# Patient Record
Sex: FEMALE | Race: ASIAN | NOT HISPANIC OR LATINO | Employment: FULL TIME | URBAN - METROPOLITAN AREA
[De-identification: names, ages, dates, MRNs, and addresses within clinical notes are randomized per-mention and may not be internally consistent; named-entity substitution may affect disease eponyms.]

---
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
## 2021-01-19 DIAGNOSIS — F43.23 ADJUSTMENT DISORDER WITH MIXED ANXIETY AND DEPRESSED MOOD: ICD-10-CM

## 2021-01-21 RX ORDER — ESCITALOPRAM OXALATE 10 MG/1
TABLET ORAL
Qty: 14 TABLET | Refills: 0 | Status: SHIPPED | OUTPATIENT
Start: 2021-01-21 | End: 2021-04-28

## 2021-01-21 NOTE — TELEPHONE ENCOUNTER
Ok for small refill . Remind pt to do follow up for med check , since she is past due. Virtual/ Video visit ok  Too if she cannot come in

## 2021-01-21 NOTE — TELEPHONE ENCOUNTER
patient # sounds like a fax a machine  Mother number - no answer voice mail is full  Spoke with father- states that is patients phone number- she is in class- he states that patient is trying to wean herself off the medication and probably did not request a refill. She has ulysses cutting down weekly by breaking the tabs up  He will speak with her tonight when she gets home from school.    Debbie SWIFTRN BSN  [facility]  [phone]

## 2021-01-22 NOTE — TELEPHONE ENCOUNTER
Called the patient and voicemail is full.  Sent a LetMeHearYa message telling patient to schedule appointment with Dr. Kilgore in the next 2 weeks for medication refill.    Nancy Connor on 1/22/2021 at 10:14 AM

## 2021-01-26 NOTE — TELEPHONE ENCOUNTER
Called the number listed for the patient and it went right to voicemail, and then said that the mailbox is full.  Sent the patient a Runcom message to schedule medication recheck with Dr. Genesis Connor on 1/26/2021 at 4:38 PM

## 2021-01-28 ENCOUNTER — VIRTUAL VISIT (OUTPATIENT)
Dept: FAMILY MEDICINE | Facility: CLINIC | Age: 21
End: 2021-01-28
Payer: COMMERCIAL

## 2021-01-28 DIAGNOSIS — F43.23 ADJUSTMENT DISORDER WITH MIXED ANXIETY AND DEPRESSED MOOD: ICD-10-CM

## 2021-01-28 PROCEDURE — 99214 OFFICE O/P EST MOD 30 MIN: CPT | Mod: 95 | Performed by: FAMILY MEDICINE

## 2021-01-28 RX ORDER — ESCITALOPRAM OXALATE 20 MG/1
20 TABLET ORAL DAILY
Qty: 30 TABLET | Refills: 1 | Status: SHIPPED | OUTPATIENT
Start: 2021-01-28 | End: 2021-03-27

## 2021-01-28 RX ORDER — BUPROPION HYDROCHLORIDE 150 MG/1
150 TABLET ORAL EVERY MORNING
Qty: 30 TABLET | Refills: 1 | Status: SHIPPED | OUTPATIENT
Start: 2021-01-28 | End: 2021-03-27

## 2021-01-28 ASSESSMENT — PATIENT HEALTH QUESTIONNAIRE - PHQ9
5. POOR APPETITE OR OVEREATING: SEVERAL DAYS
SUM OF ALL RESPONSES TO PHQ QUESTIONS 1-9: 12

## 2021-01-28 ASSESSMENT — ANXIETY QUESTIONNAIRES
7. FEELING AFRAID AS IF SOMETHING AWFUL MIGHT HAPPEN: NEARLY EVERY DAY
5. BEING SO RESTLESS THAT IT IS HARD TO SIT STILL: NOT AT ALL
1. FEELING NERVOUS, ANXIOUS, OR ON EDGE: NEARLY EVERY DAY
6. BECOMING EASILY ANNOYED OR IRRITABLE: SEVERAL DAYS
3. WORRYING TOO MUCH ABOUT DIFFERENT THINGS: NEARLY EVERY DAY
GAD7 TOTAL SCORE: 14
2. NOT BEING ABLE TO STOP OR CONTROL WORRYING: NEARLY EVERY DAY

## 2021-01-28 NOTE — PROGRESS NOTES
Cecilio is a 20 year old who is being evaluated via a billable video visit.      How would you like to obtain your AVS? MyChart  If the video visit is dropped, the invitation should be resent by: Text to cell phone: [phone]  Will anyone else be joining your video visit? No    Video Start Time: 1:49 PM  Assessment & Plan          (F43.23) Adjustment disorder with mixed anxiety and depressed mood  Comment:   Plan: escitalopram (LEXAPRO) 20 MG tablet, buPROPion         (WELLBUTRIN XL) 150 MG 24 hr tablet, MENTAL         HEALTH REFERRAL  - Adult; Outpatient Treatment;        Individual/Couples/Family/Group Therapy/Health         Psychology; [facility]: [facility]         [phone]; We will contact you to schedule        the appointment or please call with any         questions              Discussed cares, talked about signs and symptoms of anxiety/ depression and treatment options. Willing to increase the dose of lexapro  to 20 mg.   Switched Wellbutrin to 150 XL, to improve compliance. Pros/ cons of med's discussed .   encouraged to see  to help and she is willing to try again, referral given .   spent sometimes counseling patient. Follow up in 3-4 weeks, , sooner if problem.     Script sent.Cares and  treatment discussed.    Patient expressed understanding and agreement with treatment plan. All patient's questions were answered, will let me know if has more later.  Medications: Rx's: Reviewed the potential side effects/complications of medications prescribed.   She is planning to schedule for her yearly check up as well, since she  is past due         Depression Screening Follow Up    PHQ 1/28/2021   PHQ-9 Total Score 12   Q9: Thoughts of better off dead/self-harm past 2 weeks Not at all     Last PHQ-9 1/28/2021   1.  Little interest or pleasure in doing things 1   2.  Feeling down, depressed, or hopeless 1   3.  Trouble falling or staying asleep, or sleeping too much 3   4.  Feeling tired or  having little energy 3   5.  Poor appetite or overeating 3   6.  Feeling bad about yourself 0   7.  Trouble concentrating 1   8.  Moving slowly or restless 0   Q9: Thoughts of better off dead/self-harm past 2 weeks 0   PHQ-9 Total Score 12   Difficulty at work, home, or with people -       Follow Up Actions Taken  Mental Health Referral placed       Nidia Kilgore MD  [facility] JENSEN Hernandes is a 20 year old who presents to clinic today for the following health issues     HPI       Depression and Anxiety Follow-Up    How are you doing with your depression since your last visit? Doing better since she has added Wellbutrin and she has no problem taking med's but forgets to take second dose in evening. Medication has helped although she thinks  she still needs improvement with mood and anxiety.     Still has some anxiety feeling, feels bad about herself  , need some improvement with sleep etc. She has not gone to see  for over a year, wondering if she will be able to go off medication at some point in her life     She is doing school 100% virtual, goes to gym 3x week etc     How are you doing with your anxiety since your last visit?  No change    Are you having other symptoms that might be associated with depression or anxiety? No    Have you had a significant life event? OTHER: changed work envinronments     Do you have any concerns with your use of alcohol or other drugs? No         She wants to schedule for her yearly check up as well. Since she is past due     Social History     Tobacco Use     Smoking status: Never Smoker     Smokeless tobacco: Never Used   Substance Use Topics     Alcohol use: No     Alcohol/week: 0.0 standard drinks     Drug use: Not Currently     Types: Marijuana     PHQ 5/11/2020 9/30/2020 1/28/2021   PHQ-9 Total Score 12 14 12   Q9: Thoughts of better off dead/self-harm past 2 weeks Not at all Not at all Not at all     VIOLA-7 SCORE 5/11/2020  9/30/2020 1/28/2021   Total Score - - -   Total Score 10 9 14     Last PHQ-9 1/28/2021   1.  Little interest or pleasure in doing things 1   2.  Feeling down, depressed, or hopeless 1   3.  Trouble falling or staying asleep, or sleeping too much 3   4.  Feeling tired or having little energy 3   5.  Poor appetite or overeating 3   6.  Feeling bad about yourself 0   7.  Trouble concentrating 1   8.  Moving slowly or restless 0   Q9: Thoughts of better off dead/self-harm past 2 weeks 0   PHQ-9 Total Score 12   Difficulty at work, home, or with people -     VIOLA-7  1/28/2021   1. Feeling nervous, anxious, or on edge 3   2. Not being able to stop or control worrying 3   3. Worrying too much about different things 3   4. Trouble relaxing 1   5. Being so restless that it is hard to sit still 0   6. Becoming easily annoyed or irritable 1   7. Feeling afraid, as if something awful might happen 3   VIOLA-7 Total Score 14   If you checked any problems, how difficult have they made it for you to do your work, take care of things at home, or get along with other people? -       Suicide Assessment Five-step Evaluation and Treatment (SAFE-T)          Review of Systems   Constitutional, HEENT, cardiovascular, pulmonary, GI, , musculoskeletal, neuro, skin, endocrine and psych systems are negative, except as otherwise noted.      Objective           Vitals:  No vitals were obtained today due to virtual visit.    Physical Exam   GENERAL: Healthy, alert and no distress  EYES: Eyes grossly normal to inspection.  No discharge or erythema, or obvious scleral/conjunctival abnormalities.  RESP: No audible wheeze, cough, or visible cyanosis.  No visible retractions or increased work of breathing.    SKIN: Visible skin clear. No significant rash, abnormal pigmentation or lesions.  NEURO: Cranial nerves grossly intact.  Mentation and speech appropriate for age.  PSYCH: mentation appears normal, affect flat, speech pressured, judgement and  insight intact and appearance well groomed            Video-Visit Details    Type of service:  Video Visit    Video End Time:2:17 PM    Originating Location (pt. Location): Home    Distant Location (provider location):  [facility]     Platform used for Video Visit: Virobay

## 2021-01-28 NOTE — PATIENT INSTRUCTIONS
"  Patient Education     Breathing Tips to Help You Relax  [facility]  How can breathing help me relax?  When we are have strong emotions, our bodies can tense up, and our breathing can change to short, quick breaths. Sometimes, we hold our breath without even thinking about it. Taking slow, deep breaths can help us calm down and feel more balanced.   How do I do it?  Start by tensing different muscle groups as you breathe in through your nose. Then, gradually relax them as you breathe out (exhale) For example:   1. Legs and feet: Point or flex your toes. Breathe in as you tighten the muscles in your legs. Relax these muscles as you breathe out slowly.  2. Arms: Breathe in as you tighten your arm muscles. Pretend you're squeezing heidy with your hands. Relax your muscles as you breathe out slowly.  3. Face: Breathe in as you tighten the muscles of your face. Relax your muscles as you breathe out slowly.  4. Breathe out through pursed lips, like you are blowing out a candle. Notice how the air feels on the tip of your nose, and then expands your belly and ribs. Try sensing your shoulders move up--like you're filling a cup. Focus on allowing the breath to flow, instead of forcing it.  Tips to become the most relaxed:    Imagine the breath first emptying from your belly, then the lung and rib area, and then the upper chest. It helps to count and draw the number out through the entire exhale (for example, \"onnnnnnnnneeeeeee\").    Try to let all the air out when you breathe out. Your out breath (exhale) should be about twice as long as your in breath (inhale).    Close your eyes, or try watching your belly rise and fall. You can also focus on something in the room or a word picture from your imagination.    You can breathe loudly (like you're trying to fog up a mirror) or silently.    Notice how your breath feels as you inhale and exhale through your nose, mouth or both.    It may help to place a hand " "over your belly, upper chest area or any part of your body that is tense.    If your mind wanders, bring it back to what you are focusing on. Be kind to yourself--this breathing technique takes practice.    Set a time each day to practice. Work up to longer periods of time, if you can. Even brief periods of regular practice can improve mood and brain function.  For informational purposes only. Not to replace the advice of your health care provider.   Copyright   2013 [employer]. All rights reserved. [employer] 845729 - Rev 02/16.           Patient Education   Tips for Sleep Hygiene  \"Sleep hygiene\" means having good sleep habits.Follow these tips to sleep better at night:     Get on a schedule. Go to bed and get up at about the same time every day.    Listen to your body. Only try to sleep when you actually feel tired or sleepy.    Be patient. If you haven't been able to get to sleep after about 30 minutes or more, get up and do something calming or boring until you feel sleepy. Then return to bed and try again.    Don't have caffeine (coffee, tea, cola drinks, chocolate and some medicines), alcohol or nicotine (cigarettes). These can make it harder for you to fall asleep and stay asleep.    Use your bed for sleeping only. That means no TV, computer or homework in bed, especially during the evening and before bedtime.    Don't nap during the day. If you must nap, make sure it is for less than 20 minutes.    Create sleep rituals that remind your body it is time to sleep. Examples include breathing exercises, stretching or reading a book.    Avoid all electronic media (smart phone, computer, tablet) within 2 hours of bed time. The \"blue light\" in these devices activates the part of the brain that keeps you awake.    Dim the lights at night.    Get early morning sources of light (walk in the sunshine) to help set sleep patterns at night.    Try a bath or shower before bed. Having a warm bath 1 to 2 hours " "before bedtime can help you feel sleepy. Hot baths can make you alert, so be mindful of the temperature.    Don't watch the clock. Checking the clock during the night can wake you up. It can also lead to negative thoughts such as, \"I will never fall asleep,\" which can increase anxiety and sleeplessness.    Use a sleep diary. Track your sleep schedule to know your sleep patterns and to see where you can improve.    Get regular exercise every day. Try not to do heavy exercise in the 4 hours before bedtime.    Eat a healthy, balanced diet.    Try eating a light, healthy snack before bed, but avoid eating a heavy meal.    Create the right sleeping area. A cool, dark, quiet room is best. If needed, try earplugs, fans and blackout curtains.    Keep your daytime routine the same even if you have a bad night sleep. Avoiding activities the next day can make it harder to sleep.  For informational purposes only. Not to replace the advice of your health care provider.   Copyright   2013 [facility]. All rights reserved. [employer] [id number] - 01/16.       "

## 2021-01-29 ASSESSMENT — ANXIETY QUESTIONNAIRES: GAD7 TOTAL SCORE: 14

## 2021-03-25 DIAGNOSIS — F43.23 ADJUSTMENT DISORDER WITH MIXED ANXIETY AND DEPRESSED MOOD: ICD-10-CM

## 2021-03-25 NOTE — LETTER
March 31, 2021      Ayana Hollins  [address] [place] [address]        Dear Ayana,       Our records indicates that it is time for you to be seen for an office visit with Dr. Kilgore.    Please call our office at [phone] to schedule an appointment for a physical and medication follow up before you are due for any further refills.     Please disregard this notice if you have already made an appointment.    If you are no longer a [facility] patient; Please contact us and let us know that as well. You will also need to the let the pharmacy know the name of your current Provider so that they can send future request to them.       Sincerely,    [facility]

## 2021-03-25 NOTE — TELEPHONE ENCOUNTER
Routing refill request to provider for review/approval because:  PHQ 5/11/2020 9/30/2020 1/28/2021   PHQ-9 Total Score 12 14 12   Q9: Thoughts of better off dead/self-harm past 2 weeks Not at all Not at all Not at all     PHQ9 within 6 months but greater than 5-failed protocol    Shante Galvez RN

## 2021-03-27 RX ORDER — BUPROPION HYDROCHLORIDE 150 MG/1
TABLET ORAL
Qty: 30 TABLET | Refills: 1 | Status: SHIPPED | OUTPATIENT
Start: 2021-03-27 | End: 2021-07-13

## 2021-03-27 RX ORDER — ESCITALOPRAM OXALATE 20 MG/1
TABLET ORAL
Qty: 30 TABLET | Refills: 1 | Status: SHIPPED | OUTPATIENT
Start: 2021-03-27 | End: 2021-04-28

## 2021-03-29 NOTE — TELEPHONE ENCOUNTER
Called the patient and mail box was full and was unable to leave a message, so I sent the patient a Foxfly message to call and scheduled physical with Dr. Genesis Connor on 3/29/2021 at 9:17 AM

## 2021-03-31 NOTE — TELEPHONE ENCOUNTER
Called pt unable to leave voicemail mailbox full, please schedule appointment with dr Genesis Monteiro MA

## 2021-04-11 ENCOUNTER — HEALTH MAINTENANCE LETTER (OUTPATIENT)
Age: 21
End: 2021-04-11

## 2021-04-22 ENCOUNTER — IMMUNIZATION (OUTPATIENT)
Dept: NURSING | Facility: CLINIC | Age: 21
End: 2021-04-22
Payer: COMMERCIAL

## 2021-04-22 PROCEDURE — 0001A PR COVID VAC PFIZER DIL RECON 30 MCG/0.3 ML IM: CPT

## 2021-04-22 PROCEDURE — 91300 PR COVID VAC PFIZER DIL RECON 30 MCG/0.3 ML IM: CPT

## 2021-04-28 ENCOUNTER — OFFICE VISIT (OUTPATIENT)
Dept: FAMILY MEDICINE | Facility: CLINIC | Age: 21
End: 2021-04-28
Payer: COMMERCIAL

## 2021-04-28 VITALS
BODY MASS INDEX: 20.96 KG/M2 | DIASTOLIC BLOOD PRESSURE: 62 MMHG | HEIGHT: 59 IN | SYSTOLIC BLOOD PRESSURE: 90 MMHG | TEMPERATURE: 97.6 F | WEIGHT: 104 LBS | HEART RATE: 92 BPM | OXYGEN SATURATION: 98 %

## 2021-04-28 DIAGNOSIS — I88.9 ADENITIS: ICD-10-CM

## 2021-04-28 DIAGNOSIS — F43.23 ADJUSTMENT DISORDER WITH MIXED ANXIETY AND DEPRESSED MOOD: ICD-10-CM

## 2021-04-28 DIAGNOSIS — F33.1 MAJOR DEPRESSIVE DISORDER, RECURRENT EPISODE, MODERATE (H): ICD-10-CM

## 2021-04-28 DIAGNOSIS — L30.9 DERMATITIS: Primary | ICD-10-CM

## 2021-04-28 PROCEDURE — 99214 OFFICE O/P EST MOD 30 MIN: CPT | Performed by: FAMILY MEDICINE

## 2021-04-28 RX ORDER — CEPHALEXIN 500 MG/1
500 CAPSULE ORAL 3 TIMES DAILY
Qty: 30 CAPSULE | Refills: 0 | Status: SHIPPED | OUTPATIENT
Start: 2021-04-28 | End: 2021-05-08

## 2021-04-28 RX ORDER — BUPROPION HYDROCHLORIDE 300 MG/1
300 TABLET ORAL EVERY MORNING
Qty: 30 TABLET | Refills: 1 | Status: SHIPPED | OUTPATIENT
Start: 2021-04-28 | End: 2021-07-13

## 2021-04-28 RX ORDER — CETIRIZINE HYDROCHLORIDE 10 MG/1
10 TABLET ORAL DAILY
COMMUNITY
Start: 2021-04-28 | End: 2021-12-30

## 2021-04-28 RX ORDER — ESCITALOPRAM OXALATE 20 MG/1
20 TABLET ORAL DAILY
Qty: 30 TABLET | Refills: 1 | Status: SHIPPED | OUTPATIENT
Start: 2021-04-28 | End: 2021-07-13

## 2021-04-28 ASSESSMENT — ANXIETY QUESTIONNAIRES
7. FEELING AFRAID AS IF SOMETHING AWFUL MIGHT HAPPEN: SEVERAL DAYS
2. NOT BEING ABLE TO STOP OR CONTROL WORRYING: SEVERAL DAYS
3. WORRYING TOO MUCH ABOUT DIFFERENT THINGS: SEVERAL DAYS
5. BEING SO RESTLESS THAT IT IS HARD TO SIT STILL: SEVERAL DAYS
GAD7 TOTAL SCORE: 5
6. BECOMING EASILY ANNOYED OR IRRITABLE: NOT AT ALL
IF YOU CHECKED OFF ANY PROBLEMS ON THIS QUESTIONNAIRE, HOW DIFFICULT HAVE THESE PROBLEMS MADE IT FOR YOU TO DO YOUR WORK, TAKE CARE OF THINGS AT HOME, OR GET ALONG WITH OTHER PEOPLE: SOMEWHAT DIFFICULT
1. FEELING NERVOUS, ANXIOUS, OR ON EDGE: SEVERAL DAYS

## 2021-04-28 ASSESSMENT — PATIENT HEALTH QUESTIONNAIRE - PHQ9
5. POOR APPETITE OR OVEREATING: NOT AT ALL
SUM OF ALL RESPONSES TO PHQ QUESTIONS 1-9: 8

## 2021-04-28 ASSESSMENT — MIFFLIN-ST. JEOR: SCORE: 1159.49

## 2021-04-28 NOTE — PROGRESS NOTES
Assessment & Plan     (L30.9) Dermatitis  (primary encounter diagnosis)  Comment: axilla- left more the rt , likely allergic to deodorent   Plan: cephALEXin (KEFLEX) 500 MG capsule, cetirizine         (ZYRTEC) 10 MG tablet            (I88.9) Adenitis  Comment: axiall , left more then rt , related to above   Plan: cephALEXin (KEFLEX) 500 MG capsule        Start keflex . May also take zyrtec as needed . Stop deodorant. Skin cares and  treatment discussed.         follow up if problem         (F43.23) Adjustment disorder with mixed anxiety and depressed mood  Comment:   Plan: escitalopram (LEXAPRO) 20 MG tablet,            buPROPion (WELLBUTRIN XL) 300 MG 24 hr tablet              (F33.1) Major depressive disorder, recurrent episode, moderate (H)  Comment: need improvement   Plan: escitalopram (LEXAPRO) 20 MG tablet, buPROPion         (WELLBUTRIN XL) 300 MG 24 hr tablet            Discussed cares, talked about signs and symptoms of anxiety/ depression and treatment options. Willing to increase the dose of Wellbutrin to 300  mg. Pros/ cons of med's discussed   . encouraged to see  to help and referral given . spent sometimes counseling patient. Follow up in 2-3 weeks  sooner if problem.        script sent.Cares and  treatment discussed. follow up if problem   Patient expressed understanding and agreement with treatment plan. All patient's questions were answered, will let me know if has more later.  Medications: Rx's: Reviewed the potential side effects/complications of medications prescribed.       Return in about 3 weeks (around 5/19/2021) for Physical Exam.    Nidia Kilgore MD  [facility] JENS[place] Hernandes is a 20 year old who presents for the following health issues     HPI     Concern - swollen glands   Onset: x last Wednesday   Description: swollen glands under both armpits . It has happened before.  And it happens when she changed her deodorant and she thinks she  is allergic to deodorant , and she did change it  last week   Intensity: mild  Progression of Symptoms:  worsening  Accompanying Signs & Symptoms:   Previous history of similar problem: yes above   Precipitating factors:        Worsened by:   Alleviating factors:        Improved by:   Therapies tried and outcome: None    Depression and Anxiety Follow-Up    How are you doing with your depression since your last visit? Improved although mood is still a bit down.     She has no problem taking med's  is feeling comfortable on current med's and wants to continue. need refill     How are you doing with your anxiety since your last visit?  Improved     Are you having other symptoms that might be associated with depression or anxiety? No    Have you had a significant life event? No     Do you have any concerns with your use of alcohol or other drugs? No    Social History     Tobacco Use     Smoking status: Never Smoker     Smokeless tobacco: Never Used   Substance Use Topics     Alcohol use: No     Alcohol/week: 0.0 standard drinks     Drug use: Not Currently     Types: Marijuana     PHQ 5/11/2020 9/30/2020 1/28/2021   PHQ-9 Total Score 12 14 12   Q9: Thoughts of better off dead/self-harm past 2 weeks Not at all Not at all Not at all     VIOLA-7 SCORE 5/11/2020 9/30/2020 1/28/2021   Total Score - - -   Total Score 10 9 14     Last PHQ-9 1/28/2021   1.  Little interest or pleasure in doing things 1   2.  Feeling down, depressed, or hopeless 1   3.  Trouble falling or staying asleep, or sleeping too much 3   4.  Feeling tired or having little energy 3   5.  Poor appetite or overeating 3   6.  Feeling bad about yourself 0   7.  Trouble concentrating 1   8.  Moving slowly or restless 0   Q9: Thoughts of better off dead/self-harm past 2 weeks 0   PHQ-9 Total Score 12   Difficulty at work, home, or with people -     VIOLA-7  1/28/2021   1. Feeling nervous, anxious, or on edge 3   2. Not being able to stop or control worrying 3   3.  "Worrying too much about different things 3   4. Trouble relaxing 1   5. Being so restless that it is hard to sit still 0   6. Becoming easily annoyed or irritable 1   7. Feeling afraid, as if something awful might happen 3   VIOLA-7 Total Score 14   If you checked any problems, how difficult have they made it for you to do your work, take care of things at home, or get along with other people? -       Suicide Assessment Five-step Evaluation and Treatment (SAFE-T)          Review of Systems   Constitutional, HEENT, cardiovascular, pulmonary, GI, , musculoskeletal, neuro, skin, endocrine and psych systems are negative, except as otherwise noted.      Objective    BP 90/62   Pulse 92   Temp 97.6  F (36.4  C) (Tympanic)   Ht 1.51 m (4' 11.45\")   Wt 47.2 kg (104 lb)   LMP 04/10/2021   SpO2 98%   BMI 20.69 kg/m    Body mass index is 20.69 kg/m .  Physical Exam   GENERAL: healthy, alert and no distress  HENT: ear canals and TM's normal, nose and mouth without ulcers or lesions  NECK: no adenopathy, no asymmetry  RESP: lungs clear to auscultation - no rales, rhonchi or wheezes  Axilla - has fine rash in rt arm pit not as much erythema but has tender LN, left has erythematous raised folliculitis like bumps, minimally  tender   CV: regular rate and rhythm, normal S1 S2, no S3 or S4,   ABDOMEN: soft, nontender, no hepatosplenomegaly, no masses and bowel sounds normal  NEURO: grossly normal .  mentation intact and speech normal  PSYCH: mentation appears normal, affect slightly  affect flat, speech pressured, judgement and insight intact and appearance well groomed            "

## 2021-04-28 NOTE — PATIENT INSTRUCTIONS
Patient Education     Depression: Tips to Help Yourself    As your healthcare providers help treat your depression, you can also help yourself. Keep in mind that your illness affects you emotionally, physically, mentally, and socially. So full recovery will take time. Take care of your body and your soul, and be patient with yourself as you get better.  Self-care    Educate yourself. Read about treatment and medicine options. If you have the energy, attend local conferences or support groups. Keep a list of useful websites and helpful books and use them as needed. This illness is not your fault. Don t blame yourself for your depression.    Manage early symptoms. If you notice symptoms returning, experience triggers, or identify other factors that may lead to a depressive episode, get help as soon as possible. Ask trusted friends and family to monitor your behavior and let you know if they see anything of concern.    Work with your provider. Find a provider you can trust. Communicate honestly with that person and share information on your treatment for depression and your reaction to medicines.    Be prepared for a crisis. Know what to do if you experience a crisis. Keep the phone number of a crisis hotline and know the location of your community's urgent care centers and the closest emergency department.    Hold off on big decisions. Depression can cloud your judgment. So wait until you feel better before making major life decisions, such as changing jobs, moving, or getting  or .    Be patient. Recovering from depression is a process. Don t be discouraged if it takes some time to feel better.    Keep it simple. Depression saps your energy and concentration. So you won t be able to do all the things you used to do. Set small goals and do what you can.    Be with others. Don t isolate yourself--you ll only feel worse. Try to be with other people. And take part in fun activities when you can. Go to a  movie, ballgame, [employer] service, or social event. Talk openly with people you can trust. And accept help when it s offered.    Take care of your body  People with depression often lose the desire to take care of themselves. That only makes their problems worse. During treatment and afterward, make a point to:    Exercise. It s a great way to take care of your body. And studies have shown that exercise helps fight depression. Aim for 30 minutes of moderate activity a day. Walking in small blocks of time (5-10 minutes) is a good way to start, but anything that gets you moving (gardening, house cleaning) counts.    Don't use drugs and alcohol. These may ease the pain in the short term. But they ll only make your problems worse in the long run.    Get relief from stress. Ask your healthcare provider for relaxation exercises and techniques to help relieve stress. Consider activities like meditation, yoga, or Kalpesh Chi.    Eat right. A balanced and healthy diet helps keep your body healthy.    Get adequate sleep. Aim for 8 hours per night. Too much or too little sleep can cause other physical and emotional problems.  [employer] last reviewed this educational content on 12/1/2019 2000-2021 [employer]. All rights reserved. This information is not intended as a substitute for professional medical care. Always follow your healthcare professional's instructions.

## 2021-04-29 ASSESSMENT — ANXIETY QUESTIONNAIRES: GAD7 TOTAL SCORE: 5

## 2021-05-13 ENCOUNTER — IMMUNIZATION (OUTPATIENT)
Dept: NURSING | Facility: CLINIC | Age: 21
End: 2021-05-13
Attending: INTERNAL MEDICINE
Payer: COMMERCIAL

## 2021-05-13 PROCEDURE — 0002A PR COVID VAC PFIZER DIL RECON 30 MCG/0.3 ML IM: CPT

## 2021-05-13 PROCEDURE — 91300 PR COVID VAC PFIZER DIL RECON 30 MCG/0.3 ML IM: CPT

## 2021-05-24 ENCOUNTER — OFFICE VISIT (OUTPATIENT)
Dept: FAMILY MEDICINE | Facility: CLINIC | Age: 21
End: 2021-05-24
Payer: COMMERCIAL

## 2021-05-24 VITALS
OXYGEN SATURATION: 100 % | HEART RATE: 83 BPM | DIASTOLIC BLOOD PRESSURE: 68 MMHG | WEIGHT: 102 LBS | SYSTOLIC BLOOD PRESSURE: 100 MMHG | RESPIRATION RATE: 16 BRPM | BODY MASS INDEX: 20.29 KG/M2 | TEMPERATURE: 97.7 F

## 2021-05-24 DIAGNOSIS — Z11.4 SCREENING FOR HIV (HUMAN IMMUNODEFICIENCY VIRUS): ICD-10-CM

## 2021-05-24 DIAGNOSIS — Z00.00 ROUTINE GENERAL MEDICAL EXAMINATION AT A HEALTH CARE FACILITY: Primary | ICD-10-CM

## 2021-05-24 DIAGNOSIS — Z11.3 SCREENING FOR STDS (SEXUALLY TRANSMITTED DISEASES): ICD-10-CM

## 2021-05-24 DIAGNOSIS — Z11.59 NEED FOR HEPATITIS C SCREENING TEST: ICD-10-CM

## 2021-05-24 LAB — GLUCOSE SERPL-MCNC: 76 MG/DL (ref 70–99)

## 2021-05-24 PROCEDURE — 82947 ASSAY GLUCOSE BLOOD QUANT: CPT | Performed by: FAMILY MEDICINE

## 2021-05-24 PROCEDURE — 36415 COLL VENOUS BLD VENIPUNCTURE: CPT | Performed by: FAMILY MEDICINE

## 2021-05-24 PROCEDURE — 99395 PREV VISIT EST AGE 18-39: CPT | Performed by: FAMILY MEDICINE

## 2021-05-24 PROCEDURE — 86803 HEPATITIS C AB TEST: CPT | Performed by: FAMILY MEDICINE

## 2021-05-24 PROCEDURE — 87491 CHLMYD TRACH DNA AMP PROBE: CPT | Performed by: FAMILY MEDICINE

## 2021-05-24 PROCEDURE — 87389 HIV-1 AG W/HIV-1&-2 AB AG IA: CPT | Performed by: FAMILY MEDICINE

## 2021-05-24 PROCEDURE — 87591 N.GONORRHOEAE DNA AMP PROB: CPT | Performed by: FAMILY MEDICINE

## 2021-05-24 PROCEDURE — 80061 LIPID PANEL: CPT | Performed by: FAMILY MEDICINE

## 2021-05-24 NOTE — PROGRESS NOTES
SUBJECTIVE:   CC: Ayana Hollins is an 20 year old woman who presents for preventive health visit.       Patient has been advised of split billing requirements and indicates understanding: Yes  Healthy Habits:     Getting at least 3 servings of Calcium per day:  Yes    Bi-annual eye exam:  Yes    Dental care twice a year:  NO    Sleep apnea or symptoms of sleep apnea:  Daytime drowsiness    Diet:  Vegetarian/vegan    Frequency of exercise:  2-3 days/week    Duration of exercise:  30-45 minutes    Taking medications regularly:  Yes    Barriers to taking medications:  None    Medication side effects:  None    PHQ-2 Total Score: 2    Additional concerns today:  No          PROBLEMS TO ADD ON...    Today's PHQ-2 Score:   PHQ-2 ( 1999 Pf[employer]) 5/23/2021   Q1: Little interest or pleasure in doing things 1   Q2: Feeling down, depressed or hopeless 1   PHQ-2 Score 2   Q1: Little interest or pleasure in doing things Several days   Q2: Feeling down, depressed or hopeless Several days   PHQ-2 Score 2       Abuse: Current or Past (Physical, Sexual or Emotional) - No  Do you feel safe in your environment? Yes    Have you ever done Advance Care Planning? (For example, a Health Directive, POLST, or a discussion with a medical provider or your loved ones about your wishes): No, advance care planning information given to patient to review.  Patient declined advance care planning discussion at this time.    Social History     Tobacco Use     Smoking status: Never Smoker     Smokeless tobacco: Never Used   Substance Use Topics     Alcohol use: No     Alcohol/week: 0.0 standard drinks     If you drink alcohol do you typically have >3 drinks per day or >7 drinks per week? No    Alcohol Use 5/23/2021   Prescreen: >3 drinks/day or >7 drinks/week? Not Applicable       Reviewed orders with patient.  Reviewed health maintenance and updated orders accordingly - Yes  Patient Active Problem List   Diagnosis     Menarche     Nausea without  vomiting     Adjustment disorder with mixed anxiety and depressed mood     Self-inflicted injury     Suicidal ideation     Major depressive disorder, recurrent episode, moderate (H)     Past Surgical History:   Procedure Laterality Date     NO HISTORY OF SURGERY         Social History     Tobacco Use     Smoking status: Never Smoker     Smokeless tobacco: Never Used   Substance Use Topics     Alcohol use: No     Alcohol/week: 0.0 standard drinks     Family History   Problem Relation Age of Onset     Hypertension Maternal Grandmother      Unknown/Adopted Paternal Grandmother      Unknown/Adopted Paternal Grandfather      Anxiety Disorder Father         her biological dad            Breast Cancer Screening:        History of abnormal Pap smear: NO - recommended At age 22       Reviewed and updated as needed this visit by clinical staff                 Reviewed and updated as needed this visit by Provider                Past Medical History:   Diagnosis Date     NO ACTIVE PROBLEMS         Review of Systems  CONSTITUTIONAL: NEGATIVE for fever, chills, change in weight  INTEGUMENTARU/SKIN: NEGATIVE for worrisome rashes, moles or lesions  EYES: NEGATIVE for vision changes or irritation  ENT: NEGATIVE for ear, mouth and throat problems  RESP: NEGATIVE for significant cough or SOB  BREAST: NEGATIVE for masses, tenderness or discharge  CV: NEGATIVE for chest pain, palpitations or peripheral edema  GI: NEGATIVE for nausea, abdominal pain, heartburn, or change in bowel habits  : NEGATIVE for unusual urinary or vaginal symptoms. Periods are regular.  MUSCULOSKELETAL: NEGATIVE for significant arthralgias or myalgia  NEURO: NEGATIVE for weakness, dizziness or paresthesias  ENDOCRINE: NEGATIVE for temperature intolerance, skin/hair changes  HEME/ALLERGY/IMMUNE: NEGATIVE for bleeding problems  PSYCHIATRIC: NEGATIVE for changes in mood or affect     OBJECTIVE:   There were no vitals taken for this visit.  Physical Exam  GENERAL  APPEARANCE: healthy, alert and no distress  EYES: Eyes grossly normal to inspection, PERRL and conjunctivae and sclerae normal  HENT: ear canals and TM's normal, nose and mouth without ulcers or lesions, oropharynx clear and oral mucous membranes moist  NECK: no adenopathy, no asymmetry, masses, or scars and thyroid normal to palpation  RESP: lungs clear to auscultation - no rales, rhonchi or wheezes  BREAST: normal without masses, tenderness or nipple discharge and no palpable axillary masses or adenopathy  CV: regular rate and rhythm, normal S1 S2, no S3 or S4, no murmur,no peripheral edema  ABDOMEN: soft, nontender, no hepatosplenomegaly, no masses and bowel sounds normal   (female): deferred  MS: no musculoskeletal defects are noted and gait is age appropriate without ataxia  SKIN: no suspicious lesions or rashes  NEURO: Normal strength and tone, sensory exam grossly normal, mentation intact and speech normal  PSYCH: mentation appears normal and affect normal        ASSESSMENT/PLAN:   (Z00.00) Routine general medical examination at a health care facility  (primary encounter diagnosis)  Comment:   Plan: REVIEW OF HEALTH MAINTENANCE PROTOCOL ORDERS,         NEISSERIA GONORRHOEA PCR, CHLAMYDIA TRACHOMATIS        PCR, HIV Antigen Antibody Combo, Hepatitis C         Screen Reflex to HCV RNA Quant and Genotype,         Lipid panel reflex to direct LDL Fasting,         Glucose            (Z11.3) Screening for STDs (sexually transmitted diseases)  Comment:   Plan: NEISSERIA GONORRHOEA PCR, CHLAMYDIA TRACHOMATIS        PCR, HIV Antigen Antibody Combo, Hepatitis C         Screen Reflex to HCV RNA Quant and Genotype            (Z11.4) Screening for HIV (human immunodeficiency virus)  Comment:   Plan: HIV Antigen Antibody Combo            (Z11.59) Need for hepatitis C screening test  Comment:   Plan: Hepatitis C Screen Reflex to HCV RNA Quant and         Genotype          Check lab.Cares and  treatment discussed.  follow  "up if problem   Patient expressed understanding and agreement with treatment plan. All patient's questions were answered, will let me know if has more later.  Medications: Rx's: Reviewed the potential side effects/complications of medications prescribed.       COUNSELING:  Reviewed preventive health counseling, as reflected in patient instructions       Regular exercise       Healthy diet/nutrition       Vision screening       Osteoporosis prevention/bone health       Safe sex practices/STD prevention       Consider Hep C screening for all patients one time for ages 18-79 years       HIV screeninx in teen years, 1x in adult years, and at intervals if high risk    Estimated body mass index is 20.69 kg/m  as calculated from the following:    Height as of 22: 1.51 m (4' 11.45\").    Weight as of 22: 47.2 kg (104 lb).        She reports that she has never smoked. She has never used smokeless tobacco.      Counseling Resources:  ATP IV Guidelines  Pooled Cohorts Equation Calculator  Breast Cancer Risk Calculator  BRCA-Related Cancer Risk Assessment: FHS-7 Tool  FRAX Risk Assessment  ICSI Preventive Guidelines  Dietary Guidelines for Americans, 2010  USDA's MyPlate  ASA Prophylaxis  Lung CA Screening    Nidia Kilgore MD  [facility]  "

## 2021-05-25 LAB
C TRACH DNA SPEC QL NAA+PROBE: NEGATIVE
CHOLEST SERPL-MCNC: 170 MG/DL
HCV AB SERPL QL IA: NONREACTIVE
HDLC SERPL-MCNC: 46 MG/DL
HIV 1+2 AB+HIV1 P24 AG SERPL QL IA: NONREACTIVE
LDLC SERPL CALC-MCNC: 115 MG/DL
N GONORRHOEA DNA SPEC QL NAA+PROBE: NEGATIVE
NONHDLC SERPL-MCNC: 124 MG/DL
SPECIMEN SOURCE: NORMAL
SPECIMEN SOURCE: NORMAL
TRIGL SERPL-MCNC: 44 MG/DL

## 2021-06-28 ENCOUNTER — ALLIED HEALTH/NURSE VISIT (OUTPATIENT)
Dept: NURSING | Facility: CLINIC | Age: 21
End: 2021-06-28
Payer: COMMERCIAL

## 2021-06-28 DIAGNOSIS — Z23 NEED FOR VACCINATION: Primary | ICD-10-CM

## 2021-06-28 PROCEDURE — 90651 9VHPV VACCINE 2/3 DOSE IM: CPT

## 2021-06-28 PROCEDURE — 99207 PR NO CHARGE NURSE ONLY: CPT

## 2021-06-28 PROCEDURE — 90471 IMMUNIZATION ADMIN: CPT

## 2021-07-13 ENCOUNTER — OFFICE VISIT (OUTPATIENT)
Dept: FAMILY MEDICINE | Facility: CLINIC | Age: 21
End: 2021-07-13
Payer: COMMERCIAL

## 2021-07-13 VITALS
HEART RATE: 100 BPM | BODY MASS INDEX: 20.29 KG/M2 | OXYGEN SATURATION: 98 % | TEMPERATURE: 98.4 F | SYSTOLIC BLOOD PRESSURE: 84 MMHG | DIASTOLIC BLOOD PRESSURE: 60 MMHG | WEIGHT: 102 LBS

## 2021-07-13 DIAGNOSIS — N63.20 LEFT BREAST LUMP: Primary | ICD-10-CM

## 2021-07-13 DIAGNOSIS — F33.1 MAJOR DEPRESSIVE DISORDER, RECURRENT EPISODE, MODERATE (H): ICD-10-CM

## 2021-07-13 DIAGNOSIS — F43.23 ADJUSTMENT DISORDER WITH MIXED ANXIETY AND DEPRESSED MOOD: ICD-10-CM

## 2021-07-13 PROCEDURE — 99214 OFFICE O/P EST MOD 30 MIN: CPT | Performed by: FAMILY MEDICINE

## 2021-07-13 RX ORDER — ESCITALOPRAM OXALATE 20 MG/1
20 TABLET ORAL DAILY
Qty: 30 TABLET | Refills: 1 | Status: SHIPPED | OUTPATIENT
Start: 2021-07-13 | End: 2021-08-09

## 2021-07-13 RX ORDER — BUPROPION HYDROCHLORIDE 300 MG/1
300 TABLET ORAL EVERY MORNING
Qty: 30 TABLET | Refills: 1 | Status: SHIPPED | OUTPATIENT
Start: 2021-07-13 | End: 2021-08-09

## 2021-07-13 ASSESSMENT — PATIENT HEALTH QUESTIONNAIRE - PHQ9
SUM OF ALL RESPONSES TO PHQ QUESTIONS 1-9: 10
10. IF YOU CHECKED OFF ANY PROBLEMS, HOW DIFFICULT HAVE THESE PROBLEMS MADE IT FOR YOU TO DO YOUR WORK, TAKE CARE OF THINGS AT HOME, OR GET ALONG WITH OTHER PEOPLE: SOMEWHAT DIFFICULT
SUM OF ALL RESPONSES TO PHQ QUESTIONS 1-9: 10

## 2021-07-13 ASSESSMENT — ANXIETY QUESTIONNAIRES
6. BECOMING EASILY ANNOYED OR IRRITABLE: NOT AT ALL
7. FEELING AFRAID AS IF SOMETHING AWFUL MIGHT HAPPEN: NOT AT ALL
4. TROUBLE RELAXING: NOT AT ALL
1. FEELING NERVOUS, ANXIOUS, OR ON EDGE: SEVERAL DAYS
8. IF YOU CHECKED OFF ANY PROBLEMS, HOW DIFFICULT HAVE THESE MADE IT FOR YOU TO DO YOUR WORK, TAKE CARE OF THINGS AT HOME, OR GET ALONG WITH OTHER PEOPLE?: SOMEWHAT DIFFICULT
GAD7 TOTAL SCORE: 3
2. NOT BEING ABLE TO STOP OR CONTROL WORRYING: SEVERAL DAYS
5. BEING SO RESTLESS THAT IT IS HARD TO SIT STILL: NOT AT ALL
GAD7 TOTAL SCORE: 3
3. WORRYING TOO MUCH ABOUT DIFFERENT THINGS: SEVERAL DAYS
GAD7 TOTAL SCORE: 3
7. FEELING AFRAID AS IF SOMETHING AWFUL MIGHT HAPPEN: NOT AT ALL

## 2021-07-13 ASSESSMENT — PAIN SCALES - GENERAL: PAINLEVEL: NO PAIN (0)

## 2021-07-13 NOTE — PROGRESS NOTES
Assessment & Plan     (N63.20) Left breast lump  (primary encounter diagnosis)  Comment: 2 lumps - left outer qudrent of breast   Plan: US Breast Left Complete 4 Quadrants         Suspect fibroadenoma/versus cyst.  She will proceed for further evaluation    Breast ultrasound.  Follow-up as needed.     (F33.1) Major depressive disorder, recurrent episode, moderate (H)  Comment:   Plan: buPROPion (WELLBUTRIN XL) 300 MG 24 hr tablet,         escitalopram (LEXAPRO) 20 MG tablet            (F43.23) Adjustment disorder with mixed anxiety and depressed mood  Comment:   Plan: escitalopram (LEXAPRO) 20 MG tablet            Discussed cares, talked about signs and symptoms of anxiety/ depression and treatment options. Willing to stay on same medication dose for now.  pros/ cons of med's discussed .   encouraged to see  to help and referral has been given previously.  She had been reluctant in the past although she will consider, that may help some improvement in her mood. . spent sometimes counseling patient. Follow up in  3 months, sooner if problem.       Cares and  treatment discussed.  follow up if problem   Patient expressed understanding and agreement with treatment plan. All patient's questions were answered, will let me know if has more later.  Medications: Rx's: Reviewed the potential side effects/complications of medications prescribed.         Return if symptoms worsen or fail to improve, for [facility] [phone].    Nidia Kilgore MD  [facility] JENSEN Hernandes is a 20 year old who presents for the following health issues     HPI     Concern - Breast lump  Onset: ? 2 months  Description: quarter sized lump - left breast  X 2 months   No change, no pain, no discomfort but just wanted to get checked . No fam hx of breast cancer       Depression and Anxiety Follow-Up    How are you doing with your depression since your last visit? No change, taking  medication could use refill.  Sometimes has mild symptoms, but she thinks she is doing okay.  She has been reluctant to see the therapist, as she is afraid that stepfather may not approve, testing things did not go very well with the previous counselor.     How are you doing with your anxiety since your last visit?  Improved and feeling comfortable at current dose     Are you having other symptoms that might be associated with depression or anxiety? Yes:  but feeling ok on current dose     Have you had a significant life event? OTHER: family stress but she thinks she is handelling ok      Do you have any concerns with your use of alcohol or other drugs? No    Social History     Tobacco Use     Smoking status: Never Smoker     Smokeless tobacco: Never Used   Substance Use Topics     Alcohol use: No     Alcohol/week: 0.0 standard drinks     Drug use: Not Currently     Types: Marijuana     PHQ 1/28/2021 4/28/2021 7/13/2021   PHQ-9 Total Score 12 8 10   Q9: Thoughts of better off dead/self-harm past 2 weeks Not at all Not at all Not at all     VIOLA-7 SCORE 1/28/2021 4/28/2021 7/13/2021   Total Score - - -   Total Score - - 3 (minimal anxiety)   Total Score 14 5 3     Last PHQ-9 7/13/2021   1.  Little interest or pleasure in doing things 1   2.  Feeling down, depressed, or hopeless 1   3.  Trouble falling or staying asleep, or sleeping too much 1   4.  Feeling tired or having little energy 2   5.  Poor appetite or overeating 1   6.  Feeling bad about yourself 1   7.  Trouble concentrating 2   8.  Moving slowly or restless 1   Q9: Thoughts of better off dead/self-harm past 2 weeks 0   PHQ-9 Total Score 10   Difficulty at work, home, or with people -     VIOLA-7  7/13/2021   1. Feeling nervous, anxious, or on edge 1   2. Not being able to stop or control worrying 1   3. Worrying too much about different things 1   4. Trouble relaxing 0   5. Being so restless that it is hard to sit still 0   6. Becoming easily annoyed or  irritable 0   7. Feeling afraid, as if something awful might happen 0   VIOLA-7 Total Score 3   If you checked any problems, how difficult have they made it for you to do your work, take care of things at home, or get along with other people? -           Review of Systems   Constitutional, HEENT, cardiovascular, pulmonary, GI, , musculoskeletal, neuro, skin, endocrine and psych systems are negative, except as otherwise noted.      Objective    BP (!) 84/60   Pulse 100   Temp 98.4  F (36.9  C) (Tympanic)   Wt 46.3 kg (102 lb)   LMP 07/13/2021   SpO2 98%   BMI 20.29 kg/m    Body mass index is 20.29 kg/m .  Physical Exam   GENERAL: healthy, alert and no distress  NECK: no adenopathy, no asymmetry, masses, or scars and thyroid normal to palpation  RESP: lungs clear to auscultation - no rales, rhonchi or wheezes  BREAST: Right breast normal without masses, tenderness or nipple discharge and no palpable axillary masses or adenopathy  BREAST: Left with couple of palpable lumps 1 in the right upper outer quadrant and another 1 middle part of the left upper breast , they are approximately an inch size each , firm,  nontender , no no skin changes, no nipple discharge, no palpable axillary masses or adenopathy.   CV: regular rate and rhythm, normal S1 S2, no S3 or S4,  ABDOMEN: soft, nontender, no hepatosplenomegaly, no masses and bowel sounds normal  PSYCH: mentation appears normal, affect normal

## 2021-07-14 ASSESSMENT — ANXIETY QUESTIONNAIRES: GAD7 TOTAL SCORE: 3

## 2021-08-03 ENCOUNTER — HOSPITAL ENCOUNTER (OUTPATIENT)
Dept: MAMMOGRAPHY | Facility: CLINIC | Age: 21
End: 2021-08-03
Attending: FAMILY MEDICINE
Payer: COMMERCIAL

## 2021-08-03 DIAGNOSIS — N63.20 LEFT BREAST LUMP: ICD-10-CM

## 2021-08-03 PROCEDURE — 76642 ULTRASOUND BREAST LIMITED: CPT | Mod: LT

## 2021-08-05 DIAGNOSIS — F43.23 ADJUSTMENT DISORDER WITH MIXED ANXIETY AND DEPRESSED MOOD: ICD-10-CM

## 2021-08-05 DIAGNOSIS — F33.1 MAJOR DEPRESSIVE DISORDER, RECURRENT EPISODE, MODERATE (H): ICD-10-CM

## 2021-08-09 RX ORDER — ESCITALOPRAM OXALATE 20 MG/1
TABLET ORAL
Qty: 30 TABLET | Refills: 1 | Status: SHIPPED | OUTPATIENT
Start: 2021-08-09 | End: 2021-12-30

## 2021-08-09 RX ORDER — BUPROPION HYDROCHLORIDE 300 MG/1
TABLET ORAL
Qty: 30 TABLET | Refills: 1 | Status: SHIPPED | OUTPATIENT
Start: 2021-08-09 | End: 2021-12-30

## 2021-08-09 NOTE — TELEPHONE ENCOUNTER
Failed phq9 protocol.  please route to  team if patient needs an appointment     Debbie SWIFTRN BSN  [facility]  [phone]

## 2021-09-25 ENCOUNTER — HEALTH MAINTENANCE LETTER (OUTPATIENT)
Age: 21
End: 2021-09-25

## 2021-12-30 ENCOUNTER — OFFICE VISIT (OUTPATIENT)
Dept: FAMILY MEDICINE | Facility: CLINIC | Age: 21
End: 2021-12-30
Payer: COMMERCIAL

## 2021-12-30 VITALS
DIASTOLIC BLOOD PRESSURE: 50 MMHG | HEART RATE: 78 BPM | SYSTOLIC BLOOD PRESSURE: 86 MMHG | RESPIRATION RATE: 18 BRPM | TEMPERATURE: 97.6 F | OXYGEN SATURATION: 97 %

## 2021-12-30 DIAGNOSIS — F33.1 MAJOR DEPRESSIVE DISORDER, RECURRENT EPISODE, MODERATE (H): ICD-10-CM

## 2021-12-30 DIAGNOSIS — R05.9 COUGH: Primary | ICD-10-CM

## 2021-12-30 DIAGNOSIS — Z20.822 EXPOSURE TO 2019 NOVEL CORONAVIRUS: ICD-10-CM

## 2021-12-30 DIAGNOSIS — F43.23 ADJUSTMENT DISORDER WITH MIXED ANXIETY AND DEPRESSED MOOD: ICD-10-CM

## 2021-12-30 PROCEDURE — U0005 INFEC AGEN DETEC AMPLI PROBE: HCPCS | Performed by: FAMILY MEDICINE

## 2021-12-30 PROCEDURE — U0003 INFECTIOUS AGENT DETECTION BY NUCLEIC ACID (DNA OR RNA); SEVERE ACUTE RESPIRATORY SYNDROME CORONAVIRUS 2 (SARS-COV-2) (CORONAVIRUS DISEASE [COVID-19]), AMPLIFIED PROBE TECHNIQUE, MAKING USE OF HIGH THROUGHPUT TECHNOLOGIES AS DESCRIBED BY CMS-2020-01-R: HCPCS | Performed by: FAMILY MEDICINE

## 2021-12-30 PROCEDURE — 99214 OFFICE O/P EST MOD 30 MIN: CPT | Performed by: FAMILY MEDICINE

## 2021-12-30 RX ORDER — ESCITALOPRAM OXALATE 20 MG/1
20 TABLET ORAL DAILY
Qty: 90 TABLET | Refills: 1 | Status: SHIPPED | OUTPATIENT
Start: 2021-12-30 | End: 2022-03-25

## 2021-12-30 ASSESSMENT — PATIENT HEALTH QUESTIONNAIRE - PHQ9
10. IF YOU CHECKED OFF ANY PROBLEMS, HOW DIFFICULT HAVE THESE PROBLEMS MADE IT FOR YOU TO DO YOUR WORK, TAKE CARE OF THINGS AT HOME, OR GET ALONG WITH OTHER PEOPLE: SOMEWHAT DIFFICULT
SUM OF ALL RESPONSES TO PHQ QUESTIONS 1-9: 4
SUM OF ALL RESPONSES TO PHQ QUESTIONS 1-9: 4

## 2021-12-30 ASSESSMENT — PAIN SCALES - GENERAL: PAINLEVEL: NO PAIN (0)

## 2021-12-30 ASSESSMENT — ENCOUNTER SYMPTOMS: COUGH: 1

## 2021-12-30 NOTE — PROGRESS NOTES
Assessment & Plan     (R05.9) Cough  (primary encounter diagnosis)  Comment:   Plan: Symptomatic; Unknown COVID-19 Virus         (Coronavirus) by PCR Nose                 URI mild sx but had positive exposure. Check abs. talked about COVID precautions, preventing the spread , isolation etc,  just to be on safe side .  If she is negative she will schedule for booster soon    Talked about warning s/s for which should be seen   Cares and symptomatic treatment discussed. She also plans to see her eye doctor if any ongoing issue.    Follow up if problem.       (F33.1) Major depressive disorder, recurrent episode, moderate (H)  Comment:   Plan: escitalopram (LEXAPRO) 20 MG tablet            (F43.23) Adjustment disorder with mixed anxiety and depressed mood  Comment: has stopped Wellbutrin for few months and feeling ok on current med's   Plan: escitalopram (LEXAPRO) 20 MG tablet          Discussed cares, talked about signs and symptoms of anxiety/ depression and treatment options. Willing to stay on same med's for now. Pros/ cons of med's discussed .  . spent sometimes counseling patient. Follow up in 4-6   months, sooner if problem.         Check labs. refill sent.Cares and  treatment discussed. follow up if problem   Patient expressed understanding and agreement with treatment plan. All patient's questions were answered, will let me know if has more later.  Medications: Rx's: Reviewed the potential side effects/complications of medications prescribed.       Nidia Kilgore MD  [facility] JENSEN Hernandes is a 21 year old who presents for the following health issues     Cough         Acute Illness  Acute illness concerns:  Dry cough 1.5 weeks , not sure if it could be allergy related as she sneeze sometimes too but just worried about COVID. bc friend tested positive for covid and she was exposed to her last week   Symptoms:  Fever: no  Chills/Sweats: no  Headache (location?): no  Sinus  "Pressure: no  Conjunctivitis:  no  Ear Pain: no  Rhinorrhea: no  Congestion: no  Sore Throat: no  Cough: YES - Dry  Wheeze: no  Decreased Appetite: no  Nausea: no  Vomiting: no  Diarrhea: no  Dysuria/Freq.: no  Dysuria or Hematuria: no  Fatigue/Achiness: no  Sick/Strep Exposure: YES- Dad with bronchitis but had negative COVID   Therapies tried and outcome: NONE  Depression and Anxiety Follow-Up    How are you doing with your depression since your last visit? Improved has stopped Wellbutrin\"  few months ago bc it did  Not help and did not like it made her felt\". But now she if feeling fine without it and     How are you doing with your anxiety since your last visit?  Improved     Are you having other symptoms that might be associated with depression or anxiety? No    Have you had a significant life event? No     Do you have any concerns with your use of alcohol or other drugs? No    Social History     Tobacco Use     Smoking status: Never Smoker     Smokeless tobacco: Never Used   Substance Use Topics     Alcohol use: No     Alcohol/week: 0.0 standard drinks     Drug use: Not Currently     Types: Marijuana     PHQ 4/28/2021 7/13/2021 12/30/2021   PHQ-9 Total Score 8 10 4   Q9: Thoughts of better off dead/self-harm past 2 weeks Not at all Not at all Not at all     VIOLA-7 SCORE 1/28/2021 4/28/2021 7/13/2021   Total Score - - -   Total Score - - 3 (minimal anxiety)   Total Score 14 5 3     Last PHQ-9 12/30/2021   1.  Little interest or pleasure in doing things 0   2.  Feeling down, depressed, or hopeless 1   3.  Trouble falling or staying asleep, or sleeping too much 1   4.  Feeling tired or having little energy 1   5.  Poor appetite or overeating 1   6.  Feeling bad about yourself 0   7.  Trouble concentrating 0   8.  Moving slowly or restless 0   Q9: Thoughts of better off dead/self-harm past 2 weeks 0   PHQ-9 Total Score 4   Difficulty at work, home, or with people -     VIOLA-7  7/13/2021   1. Feeling nervous, anxious, " or on edge 1   2. Not being able to stop or control worrying 1   3. Worrying too much about different things 1   4. Trouble relaxing 0   5. Being so restless that it is hard to sit still 0   6. Becoming easily annoyed or irritable 0   7. Feeling afraid, as if something awful might happen 0   VIOLA-7 Total Score 3   If you checked any problems, how difficult have they made it for you to do your work, take care of things at home, or get along with other people? -           Review of Systems   Respiratory: Positive for cough.       Constitutional, HEENT, cardiovascular, pulmonary, GI, , musculoskeletal, neuro, skin, endocrine and psych systems are negative, except as otherwise noted.      Objective    BP (!) 86/50   Pulse 78   Temp 97.6  F (36.4  C) (Tympanic)   Resp 18   LMP 11/29/2021 (Approximate)   SpO2 97%   There is no height or weight on file to calculate BMI.  Physical Exam   GENERAL: healthy, alert and no distress  HENT: oropharynx clear and oral mucous membranes moist, no sinus tenderness   NECK: no adenopathy  RESP: lungs clear to auscultation - no rales, rhonchi or wheezes  CV: regular rate and rhythm, normal S1 S2,   ABDOMEN: soft, nontender, no hepatosplenomegaly, no masses and bowel sounds normal  NEURO: Normal strength and tone, mentation intact and speech normal  PSYCH: mentation appears normal, affect normal

## 2021-12-30 NOTE — PATIENT INSTRUCTIONS
Patient Education       Coronavirus Disease 2019 (COVID-19): Overview  Coronavirus disease 2019 (COVID-19) is an illness that infects the lungs. It's caused by a type of coronavirus called SARS-CoV-2. There are many types of coronaviruses. They are a very common cause of colds and bronchitis. They can cause a lung infection called pneumonia. Symptoms can range from mild to severe. Some people have no symptoms. These viruses are also found in some animals.   The virus that causes COVID-19 changes (mutates) all the time. This is what all viruses do. It leads to different versions of a virus. These are called variants. COVID-19 variants may spread more easily from person to person. They may cause milder symptoms. Or they may cause more severe symptoms.    The virus spreads and infects people easily. It can infect a person more easily if they are not immune to it. The virus most often spreads through droplets of fluid that a person coughs or sneezes into the air. It may be spread to you if you touch a surface with the virus on it and then touch your eyes, nose, or mouth.      To help prevent spreading the infection, wash your hands often, or use an alcohol-based hand .     For the latest information from the CDC:      Go to the CDC website    Call [phone] ([phone])    What are the symptoms of COVID-19?  Some people have no symptoms. Some have mild symptoms. And other people may have severe symptoms. Types of symptoms can vary from person to person. They may appear 2 to 14 days after contact with the virus. They can include:     Fever    Chills    Coughing    Trouble breathing or feeling short of breath    Sore throat    Stuffy or runny nose    Headache    Body aches    Tiredness    Nausea, vomiting, diarrhea, or abdominal pain    New loss of sense of smell or taste  Check your symptoms with the CDC s Coronavirus Self-.   What are possible complications of COVID-19?  The virus can cause an  infection in the lungs. This is called pneumonia. In some cases, this can lead to death. Experts are still learning more about COVID-19 complications. Many other complications are possible. They include:     Low blood pressure    Kidney failure    Inflammation of the brain or heart    Rashes  Some people are at higher risk for complications. This includes:     Older adults    People with heart or lung disease    People with diabetes or kidney disease    People with health conditions that suppress the immune system    People who take medicines that suppress the immune system  Rarely, a child may have a severe complication. This is called multisystem inflammatory syndrome in children (MIS-C). MIS-C seems to be like Kawasaki disease. This is a rare illness. It causes inflammation of blood vessels and body organs. MIS can also happen in adults. But this is less common.     How is COVID-19 diagnosed?  Your healthcare provider will ask:    What symptoms you have    Where you live    If you ve traveled recently    If you ve had contact with sick people    If you are vaccinated against COVID-19    If you have had COVID-19   You may have 1 of these tests for COVID-19:    Viral (molecular) test. You may also hear this called a PCR or RT-PCR test. Viral tests are very accurate. A viral test looks for the genetic material (RNA) of the SARS-CoV-2 virus. There are a few ways to do this. A swab may be wiped inside your nose or throat. Or a long swab may be put into your nose down to the back of your throat. Or a sample of your saliva may be taken. Your test results may be back in 45 minutes to a few hours. This depends on the type of test. Some tests must be sent to a lab. These can take several days for the results. Test kits you can use at home are now available. Some of these need a prescription. If you use a home kit, follow the instructions in the kit closely. Some kits show results quickly at home. Others must be sent to a  lab for the results.    Antigen test. This can find proteins from the SARS-CoV-2 virus. A swab may be wiped inside your nose or throat. Or a long swab may be put into your nose down to the back of your throat. Some results are back within 15 to 60 minutes. This depends on the type of test. Positive results are very accurate. But false positive results can happen. And the results can be negative even in people with COVID-19. This is more common in places where not many people have the virus. Antigen tests are more likely to miss a COVID-19 infection than a viral (molecular) test. You may need to have a viral test if your antigen test is negative but you have symptoms of COVID-19.  If your provider thinks or confirms that you have COVID-19, you may have other tests. These tests may include:     Antibody blood test. This type of test can show if you had the virus in the past. It shows antibodies for the virus in the blood. The accuracy of these tests varies. And they are not available everywhere. An antibody test may not show if you have an infection right now. This is because it can take up to a few weeks for your body to make antibodies. None of the antibody tests can yet be used to tell if a person is immune to the virus.    Sputum culture. If you have a wet cough, you may be asked to cough up a bit of mucus (sputum) from your lungs. This is tested for the virus. It may be tested for pneumonia.    Imaging tests. You may have a chest X-ray or CT scan.  Can you get COVID-19 again?  Yes, you can get COVID-19 more than once. You may have not gotten immunity. You could have lost the immunity. Or you may get COVID-19 from a different strain (variant) of the virus that you are not immune to. But the COVID-19 vaccine helps people who had COVID-19 lower their risk of having the illness again.   Vaccines for COVID-19  The FDA and CDC advise vaccines to help prevent COVID-19. The vaccines can also make the illness less severe.  It can keep you from needing to go to the hospital.  And it can prevent the spread of the virus to other people. No vaccine is 100% effective at preventing an illness. But getting a vaccine is important. The Pfizer vaccine is available for people as young as age 5. Pregnant or breastfeeding people can have the vaccine. Ask your healthcare provider which vaccine may be best for you.   The vaccines are given as a shot (injection). This is most often done in a muscle in the upper arm. There is a 1-dose vaccine. This is from [employer]. There are 2-dose vaccines. These are from Pfizer and Moderna. For a 2-dose vaccine, the second dose is given several weeks after the first. Some people may need a third dose of Pfizer or Moderna.   Who needs a third dose of Pfizer or Moderna?  A third dose of the Pfizer or Moderna vaccine may be needed for some people. This is for people who have a very weak immune system. The third dose is part of their first (primary) series. It's not a booster. This can happen if you had a solid organ transplant. It can be caused by other conditions or treatments. This third dose is to help a person with a weak immune system build up better protection against the virus. It's given at least 28 days after the second dose. Talk with your healthcare provider about your health risks to see if you need a third dose as part of your primary series.   COVID-19 vaccine booster shots  Everyone age 18 or older can get a COVID-19 booster shot. Here are your options.   Pfizer or Moderna booster  A booster shot of the Pfizer or Moderna vaccines is advised for many people. The booster shot is to be given at least 6 months after your primary series. Talk with your healthcare provider about your situation and risk. The CDC says these people should get a Pfizer or Moderna booster shot:       People age 50 or older    People age 18 or older who live in long-term care facilities    The CDC states people 18 to 49  may choose to get the booster. This depends on their specific case and risk. This includes people with certain health conditions. It also includes people whose job or living setting puts them at higher risk for COVID-19.   Felix & Felix booster  A booster shot of the 1-dose Felix & Felix vaccine is also available. It's advised for people ages 18 and older who got their first dose 2 or more months ago.   Mix and match  You may be able to choose which vaccine you get as a booster. This is called mix and match. Talk with your healthcare provider to learn more.   How is COVID-19 treated?  The most proven treatments right now are those to help your body while it fights the virus. This is known as supportive care. It includes:     Getting rest.This helps your body fight the illness.    Drinking fluids. Try to drink 6 to 8 glasses of fluids every day. Ask your provider which drinks are best for you. Don't have drinks with caffeine or alcohol.    Taking over-the-counter (OTC) medicine.  These are used to help ease pain and reduce fever. Ask your provider which OTC medicine is safe for you to use.  You may need to stay in the hospital for severe illness. Your care may include:     IV (intravenous) fluids. These are given through a vein. This helps to replace fluids in your body.    Oxygen. You may be given supplemental oxygen. Or you may be put on a breathing machine (ventilator). This is done so you get enough oxygen in your body.    Prone positioning. Your healthcare team may regularly turn you on your stomach. This is called prone positioning. It helps increase the amount of oxygen you get to your lungs. Follow their instructions on position changes while you're in the hospital. Also follow their advice on the best positions to help your breathing once you go home.    Remdesivir. This is an antiviral medicine. The FDA has approved it for use on COVID-19. It works by stopping the spread of the SARS-CoV-2 virus in  the body. It's approved only for people who are in the hospital. It's for people 12 years and older who weigh at least 88 pounds (40 kgs). In some cases, it may also be used for people younger than 12 years or who weigh less than 88 pounds (40 kgs).    Steroids or other anti-inflammatory medicines.  These are used to lessen the intense inflammation that some people with COVID-19 can have. The inflammation can lead to more trouble breathing. It can cause other complications or death.    COVID-19 convalescent plasma. Plasma is the liquid part of blood. People who had COVID-19 may be asked to donate plasma. This is called COVID-19 convalescent plasma. The plasma may have antibodies. These can help fight COVID-19 in people who are very ill with it. The FDA has approved it for emergency use in some people with severe but early COVID-19. Ask your provider if you qualify to donate.    Monoclonal antibody therapy. The FDA approved this for emergency use in some people. They must have a positive COVID-19 viral test. They must have mild to moderate symptoms. They can t be in the hospital. It's approved for people 12 years and older. They must weigh at least 88 pounds (40 kgs). And they must be at high risk for severe COVID-19 and a hospital stay. This includes people who are 65 years and older. And it includes people with some chronic conditions. This therapy is not approved for people who are in the hospital with COVID-19 or who need oxygen. Your healthcare team will tell you if you qualify.  Are you at risk for COVID-19?  You are at risk for COVID-19 if any of these apply to you:    You live in an area with cases of COVID-19    You traveled to an area with cases of COVID-19    You had close contact with someone who had COVID-19  Close contact means being within 6 feet.   Keep in mind that COVID-19 may be spread by people who do not show symptoms.   Last updated: 11/22/2021   [employer] last reviewed this educational content  on 9/1/2021 2000-2021 [employer]. All rights reserved. This information is not intended as a substitute for professional medical care. Always follow your healthcare professional's instructions.           Patient Education     Understanding COVID-19 (Coronavirus Disease 2019)  COVID-19 is an illness of the lungs. It causes fever, coughing and trouble breathing. The illness is caused by a new virus in the coronavirus family called SARS-CoV-2.  First seen in late 2019, this virus has spread to many cities and countries around the world. It seems to spread and infect people fairly easily. Scientists are working to understand it better.  For the latest information, visit the CDC website at www.cdc.gov/coronavirus/2019-ncov.html Or call 791-MDA-XMDM ([phone]).   How does COVID-19 spread?  The virus may spread by:    Breathing in droplets of fluid that someone has coughed or sneezed into the air.    Touching your eyes, nose or mouth after touching an infected surface. (The virus can live on handles, objects and other surfaces.)  What are the symptoms of COVID-19?  Symptoms may appear 2 to 14 days after contact with the virus. They can include:    Fever    Dry cough    Trouble breathing    Other flu-like symptoms  Many people have no symptoms or only mild symptoms.  In some cases, this virus can cause infection (pneumonia) in both lungs. This can make a person very ill, and it can even cause death.  Am I at risk?  You are at risk for getting COVID-19 if:    You live in (or recently traveled to) an area with a COVID-19 outbreak    You had contact with a sick person who recently traveled to an area with an outbreak    You had contact with someone who has or may have COVID-19  Your chances of severe illness are higher if:    You are an older adult    You have heart or lung disease    You have diabetes or another serious health issue  How is COVID-19 diagnosed?  Your care team will ask about your  "symptoms, recent travel and contact with sick people.     Not everyone will be tested for the virus. If you need to be tested, we will use a cotton swab to take a sample of mucus from your nose or throat. Or, we may collect mucus that you have coughed up from your lungs (sputum).  How is COVID-19 treated?  At this time, there is no medicine to treat COVID-19. If you get sick, there are things you can do to help your body fight the virus. These may include:    Medicine (acetaminophen) to help ease pain and reduce fever.    Bed rest to help your body fight the illness.  If you are very sick, you may need to stay in the hospital. We may give you fluids through a vein to keep you hydrated (IV fluids). To make sure your body gets enough oxygen, we may give you an oxygen mask or a breathing machine (ventilator).  How can I protect myself and others from COVID-19?  Be prepared. Try to keep a 2-week supply of medicines, food and other household items. Plan who will care for you, your children and pets if you get sick. And, stay informed about COVID-19 in your area.  There is no vaccine yet for COVID-19. To protect yourself and others:    Wash your hands often. Use soap and clean, running water for at least 20 seconds.    If you can t use soap and water, use hand . Make sure it has at least 60% alcohol.    Don't touch your eyes, nose or mouth unless you have clean hands.    Try to avoid \"high-touch\" public surfaces, like doorknobs. Don't shake hands.    Clean home and work surfaces often with disinfectant.    Cough or sneeze into a tissue, then throw the tissue into the trash. (If you don't have tissues, cough or sneeze into the bend of your elbow.) Wash your hands after coughing or sneezing.    Don t share food or household items, like eating and drinking utensils.    Stay about 6 feet away from others as much you can. This is called  social distancing.     Stay away from people who are sick.    Try to avoid areas " that have a COVID-19 outbreak.  For the latest travel alerts, visit the CDC website at: www.cdc.gov/coronavirus/2019-ncov/travelers   If you ve been in an area with COVID-19 in the last 14 days:    You may need to stay home or limit your activities for up to 14 days. Call your care team for instructions.    Check for fever. Take your temperature every morning and evening for at least 14 days. Keep a record of the readings.    Stay home if you are sick for any reason.    You do not need to wear a face mask unless you are sick.    Watch for symptoms of the virus.  If you have COVID-19 (or symptoms of COVID-19):    Stay home and contact your care team. We will tell you what to do.    Don t leave home unless you need to get medical care. Don't go to work, school or public areas. Don't use buses, taxis or other public transportation.    Wash your hands often.    Stay away from other people in your home. Limit visitors. No kissing. No sharing household items.    Clean any surfaces you touch with disinfectant.    Cough or sneeze into a tissue, throw the tissue in the trash, then wash your hands. If you don't have tissues, cough or sneeze into the bend of your elbow.    Wear a face mask to protect others from your germs. If you can t wear a mask, your caregivers should wear one.    If you need to go to the hospital or clinic, call ahead to let them know. Expect the care team to wear masks, gowns, gloves and eye protection. You may be put in a separate room.    Follow all instructions from your care team.    Don t panic. Keep in mind that other illnesses can cause similar symptoms.  If you are caring for a sick person:    Follow all instructions from the care team.    Wash your hands often.    Wear protective clothing as advised.    Make sure the sick person wears a face mask. If they can't wear a mask, don't stay in the same room with the person. If you must be in the same room, wear a face mask.    Keep track of the sick  person s symptoms.    Clean surfaces, fabrics and laundry well and often.    Keep other people and pets away from the sick person.  Should I worry about my pets?  At this time, there are no reports of pets getting sick with COVID-19 or spreading the virus that causes it. Until we know more, be sure to:    Wash your hands after touching any animals.    Don't touch animals that may be sick.    Keep pets away from sick people.    Limit your contact with pets and animals if you are sick.  When to contact your care team    Before coming to the hospital or clinic    If you have symptoms of COVID-19 and have recently been in an area with an outbreak    If you have COVID-19 and your symptoms are worse   This information has been modified by your health care provider with permission from the publisher.  For informational purposes only. Not to replace the advice of your health care provider. Copyright   2020 [employer] [facility]. All rights reserved.

## 2021-12-31 LAB — SARS-COV-2 RNA RESP QL NAA+PROBE: NEGATIVE

## 2021-12-31 ASSESSMENT — PATIENT HEALTH QUESTIONNAIRE - PHQ9: SUM OF ALL RESPONSES TO PHQ QUESTIONS 1-9: 4

## 2022-01-02 PROBLEM — J45.909 ASTHMA: Status: ACTIVE | Noted: 2022-01-02

## 2022-01-19 ENCOUNTER — IMMUNIZATION (OUTPATIENT)
Dept: NURSING | Facility: CLINIC | Age: 22
End: 2022-01-19
Payer: COMMERCIAL

## 2022-01-19 PROCEDURE — 91305 COVID-19,PF,PFIZER (12+ YRS): CPT

## 2022-01-19 PROCEDURE — 0054A COVID-19,PF,PFIZER (12+ YRS): CPT

## 2022-01-24 ENCOUNTER — NURSE TRIAGE (OUTPATIENT)
Dept: NURSING | Facility: CLINIC | Age: 22
End: 2022-01-24
Payer: COMMERCIAL

## 2022-01-24 NOTE — TELEPHONE ENCOUNTER
"Caller is reporting  pain in  upper abdomen for  4 days ; worse when she does not eat; has a hard time eating and has not eatened  since yesterday; states this has been going on for > 1 month but may be as long as  2 years. Reports she has episodes of binge eating when she I s with her parents but denies purging.  Only eats once daily and then in small amounts  Is  On meds for anxiety and depression but has is not in counseling  and ha never discussed eating habits other than  to  report poor appetite.  Describes stomach pain as a \"fluttering \"; notes stomach does\" growl\" but does not recognize this as  a need to eat. Denies vomiting, fever or \"heartburn \" fever, diarrhea melena or other GI symptoms.  Triage protocol reviewed.   Caller is advised to  eat easily digested simple carbs now to alleviate  pain   Advised to be seen in clinic and  able to  schedule  appointment for tomorrow   Caller is advised to discuss eating  Issues with provider   Advised to call back for any new or worsening symptoms    Caller understands and will comply   Tessa Cooper RN  FNA                 Reason for Disposition    MODERATE OR MILD pain that comes and goes (cramps) lasts > 24 hours    Pain is mainly in upper abdomen (if needed ask: 'is it mainly above the belly button?')    MILD pain that comes and goes (cramps) lasts > 24 hours    Additional Information    Negative: Passed out (i.e., fainted, collapsed and was not responding)    Negative: Shock suspected (e.g., cold/pale/clammy skin, too weak to stand, low BP, rapid pulse)    Negative: Sounds like a life-threatening emergency to the triager    Negative: Chest pain    Negative: Abdominal pain and pregnant > 20 weeks    Negative: Abdominal pain and pregnant < 20 weeks    Negative: SEVERE abdominal pain (e.g., excruciating)    Negative: Vomiting red blood or black (coffee ground) material    Negative: Bloody, black, or tarry bowel movements (Exception: chronic-unchanged black-grey " bowel movements and is taking iron pills or Pepto-bismol)    Negative: Constant abdominal pain lasting > 2 hours    Negative: Vomiting bile (green color)    Negative: Patient sounds very sick or weak to the triager    Negative: Vomiting and abdomen looks much more swollen than usual    Negative: White of the eyes have turned yellow (i.e., jaundice)    Negative: Blood in urine (red, pink, or tea-colored)    Negative: Fever > 103 F (39.4 C)    Negative: Fever > 101 F (38.3 C) and over 60 years of age    Negative: Fever > 100.0 F (37.8 C) and has diabetes mellitus or a weak immune system (e.g., HIV positive, cancer chemotherapy, organ transplant, splenectomy, chronic steroids)    Negative: Fever > 100.0 F (37.8 C) and bedridden (e.g., nursing home patient, stroke, chronic illness, recovering from surgery)    Negative: Pregnant or could be pregnant (i.e., missed last menstrual period)    Negative: Passed out (i.e., fainted, collapsed and was not responding)    Negative: Shock suspected (e.g., cold/pale/clammy skin, too weak to stand, low BP, rapid pulse)    Negative: Visible sweat on face or sweat is dripping down    Negative: Chest pain    Negative: SEVERE abdominal pain (e.g., excruciating)    Negative: Pain lasting > 10 minutes and over 50 years old    Negative: Pain lasting > 10 minutes and over 40 years old and associated chest, arm, neck, upper back, or jaw pain    Negative: Pain lasting > 10 minutes and over 35 years old and at least one cardiac risk factor    Negative: Pain lasting > 10 minutes and history of heart disease (i.e., heart attack, bypass surgery, angina, angioplasty, CHF)    Negative: Recent injury to the abdomen    Negative: Vomiting red blood or black (coffee ground) material    Negative: Bloody, black, or tarry bowel movements (Exception: chronic-unchanged  black-grey bowel movements and is taking iron pills or Pepto-bismol)    Negative: Pregnant > 24 weeks and hand or face swelling    Negative:  Constant abdominal pain lasting > 2 hours    Negative: Vomiting bile (green color)    Negative: Patient sounds very sick or weak to the triager    Negative: Vomiting and abdomen looks much more swollen than usual    Negative: White of the eyes have turned yellow (i.e.,  jaundice)    Negative: Fever > 103 F (39.4 C)    Negative: Fever > 101 F (38.3 C) and over 60 years of age    Negative: Fever > 100.0 F (37.8 C) and has diabetes mellitus or a weak immune system (e.g., HIV positive, cancer chemotherapy, organ transplant, splenectomy, chronic steroids)    Negative: Fever > 100.0 F (37.8 C) and bedridden (e.g., nursing home patient, stroke, chronic illness, recovering from surgery)    Negative: Age > 60 years    Negative: Patient wants to be seen    Protocols used: ABDOMINAL PAIN - FEMALE-A-OH, ABDOMINAL PAIN - UPPER-A-OH

## 2022-01-25 ENCOUNTER — TELEPHONE (OUTPATIENT)
Dept: FAMILY MEDICINE | Facility: CLINIC | Age: 22
End: 2022-01-25

## 2022-01-25 ENCOUNTER — OFFICE VISIT (OUTPATIENT)
Dept: INTERNAL MEDICINE | Facility: CLINIC | Age: 22
End: 2022-01-25
Payer: COMMERCIAL

## 2022-01-25 VITALS
SYSTOLIC BLOOD PRESSURE: 90 MMHG | BODY MASS INDEX: 20.87 KG/M2 | HEART RATE: 76 BPM | DIASTOLIC BLOOD PRESSURE: 60 MMHG | HEIGHT: 59 IN | WEIGHT: 103.5 LBS | TEMPERATURE: 97.8 F | OXYGEN SATURATION: 97 %

## 2022-01-25 DIAGNOSIS — R10.11 ABDOMINAL PAIN, RIGHT UPPER QUADRANT: Primary | ICD-10-CM

## 2022-01-25 LAB
ALBUMIN SERPL-MCNC: 4.5 G/DL (ref 3.4–5)
ALBUMIN UR-MCNC: NEGATIVE MG/DL
ALP SERPL-CCNC: 59 U/L (ref 40–150)
ALT SERPL W P-5'-P-CCNC: 21 U/L (ref 0–50)
ANION GAP SERPL CALCULATED.3IONS-SCNC: 4 MMOL/L (ref 3–14)
APPEARANCE UR: CLEAR
AST SERPL W P-5'-P-CCNC: 14 U/L (ref 0–45)
BILIRUB SERPL-MCNC: 0.5 MG/DL (ref 0.2–1.3)
BILIRUB UR QL STRIP: NEGATIVE
BUN SERPL-MCNC: 16 MG/DL (ref 7–30)
CALCIUM SERPL-MCNC: 9.8 MG/DL (ref 8.5–10.1)
CHLORIDE BLD-SCNC: 106 MMOL/L (ref 94–109)
CO2 SERPL-SCNC: 28 MMOL/L (ref 20–32)
COLOR UR AUTO: YELLOW
CREAT SERPL-MCNC: 0.61 MG/DL (ref 0.52–1.04)
ERYTHROCYTE [DISTWIDTH] IN BLOOD BY AUTOMATED COUNT: 11.2 % (ref 10–15)
GFR SERPL CREATININE-BSD FRML MDRD: >90 ML/MIN/1.73M2
GLUCOSE BLD-MCNC: 97 MG/DL (ref 70–99)
GLUCOSE UR STRIP-MCNC: NEGATIVE MG/DL
HCG UR QL: NEGATIVE
HCT VFR BLD AUTO: 43.1 % (ref 35–47)
HGB BLD-MCNC: 13.6 G/DL (ref 11.7–15.7)
HGB UR QL STRIP: NEGATIVE
KETONES UR STRIP-MCNC: ABNORMAL MG/DL
LEUKOCYTE ESTERASE UR QL STRIP: NEGATIVE
MCH RBC QN AUTO: 27.4 PG (ref 26.5–33)
MCHC RBC AUTO-ENTMCNC: 31.6 G/DL (ref 31.5–36.5)
MCV RBC AUTO: 87 FL (ref 78–100)
NITRATE UR QL: NEGATIVE
PH UR STRIP: 6 [PH] (ref 5–7)
PLATELET # BLD AUTO: 251 10E3/UL (ref 150–450)
POTASSIUM BLD-SCNC: 3.9 MMOL/L (ref 3.4–5.3)
PROT SERPL-MCNC: 8.4 G/DL (ref 6.8–8.8)
RBC # BLD AUTO: 4.96 10E6/UL (ref 3.8–5.2)
SODIUM SERPL-SCNC: 138 MMOL/L (ref 133–144)
SP GR UR STRIP: 1.02 (ref 1–1.03)
UROBILINOGEN UR STRIP-ACNC: 0.2 E.U./DL
WBC # BLD AUTO: 5.8 10E3/UL (ref 4–11)

## 2022-01-25 PROCEDURE — 99214 OFFICE O/P EST MOD 30 MIN: CPT | Performed by: INTERNAL MEDICINE

## 2022-01-25 PROCEDURE — 36415 COLL VENOUS BLD VENIPUNCTURE: CPT | Performed by: INTERNAL MEDICINE

## 2022-01-25 PROCEDURE — 81025 URINE PREGNANCY TEST: CPT | Performed by: INTERNAL MEDICINE

## 2022-01-25 PROCEDURE — 85027 COMPLETE CBC AUTOMATED: CPT | Performed by: INTERNAL MEDICINE

## 2022-01-25 PROCEDURE — 80053 COMPREHEN METABOLIC PANEL: CPT | Performed by: INTERNAL MEDICINE

## 2022-01-25 PROCEDURE — 81003 URINALYSIS AUTO W/O SCOPE: CPT | Performed by: INTERNAL MEDICINE

## 2022-01-25 ASSESSMENT — MIFFLIN-ST. JEOR: SCORE: 1145.1

## 2022-01-25 NOTE — LETTER
[address] [address]  [phone]      1/25/2022       Ayana Hollins  [address] [place] [address]        To Whom it May Concern,    Patient had an office visit with Dr. Hollins on 1/25/2022. Please contact the clinic with any concerns.     Sincerely,      Tylor Maguire RN  Internal Medicine

## 2022-01-25 NOTE — PROGRESS NOTES
"  Assessment & Plan   Problem List Items Addressed This Visit     None      Visit Diagnoses     Abdominal pain, right upper quadrant    -  Primary    Relevant Orders    UA macro with reflex to Microscopic and Culture - Clinc Collect (Completed)    HCG qualitative urine (Completed)    CBC with platelets (Completed)    Comprehensive metabolic panel (Completed)    US Abdomen Limited             *  Your abdominal pain may be due to your gall bladder or a gall stone within your gall bladder.     *  Labs tests to check the liver and kidneys.     *  Upper abdominal ultrasound to evaluate the gall bladder and liver.     *  Call me the next day after the ultrasound for the results and for further directions.      *  Until this issue it fully evaluated, try to avoid fatty foods as much as possible.  Try to keep the diet as bland as possible.  Fat in foods can signal the gall bladder to \"sqeeze\" causing more pain.     *  If the gall bladder is found to be the source of your pain, then it will need to be removed via surgery.  We will send you to a surgeon as indicated.     *  If you ever develop very severe, unrelenting pain, then go to the Emergency room immediately for an evaluation.        *  Start taking Prilosec OTC (omeprazole) 20 mg once per day for the next 2-4 weeks to treat possible gastritis in case the ultrasound shows no evidence for gall bladder disease.                      Return in about 3 weeks (around 2/15/2022) for for recheck, if the symptoms fail to improve.    Travon Hollins MD  [facility]    Atul Hernandes is a 21 year old who presents for the following health issues     HPI     Concern - abdominal pain  Onset: 1-22-22  Description: right lower rib area  Intensity: mild  Progression of Symptoms:  same and intermittent  Accompanying Signs & Symptoms: none  Previous history of similar problem: none  Precipitating factors:        Worsened by: not " "eating  Alleviating factors:        Improved by: eating  Therapies tried and outcome:  none     Right upper abd pain x 3 days.   Stays in RUQ, occ radiation  Never has had before  No N/V  No fevers, no chills  Worse with not eating.     Has BM formed twice per day, formed, no diarrhea, no melena.     No heartburn, no acid reflux.     Menses normal, last menses 1/7/22    **I reviewed the information recorded in the patient's EPIC chart (including but not limited to medical history, surgical history, family history, problem list, medication list, and allergy list) and updated the information as indicated based on the patients reported information.         Review of Systems   Constitutional, HEENT, cardiovascular, pulmonary, gi and gu systems are negative, except as otherwise noted.      Objective    BP 90/60   Pulse 76   Temp 97.8  F (36.6  C) (Oral)   Ht 1.499 m (4' 11\")   Wt 46.9 kg (103 lb 8 oz)   LMP 01/07/2022 (Exact Date)   SpO2 97%   Breastfeeding No   BMI 20.90 kg/m    Body mass index is 20.9 kg/m .  Physical Exam   GENERAL alert and no distress  EYES:  Normal sclera,conjunctiva, EOMI  HENT: oral and posterior pharynx without lesions or erythema, facies symmetric  NECK: Neck supple. No LAD, without thyroidmegaly.  RESP: Clear to ausculation bilaterally without wheezes or crackles. Normal BS in all fields.  CV: RRR normal S1S2 without murmurs, rubs or gallops.  LYMPH: no cervical lymph adenopathy appreciated  MS: extremities- no gross deformities of the visible extremities noted,   EXT:  no lower extremity edema  PSYCH: Alert and oriented times 3; speech- coherent  SKIN:  No obvious significant skin lesions on visible portions of face   ABD:  Pain to palpation RUQ qwuadrant, repropduces her pain  Positive murphrys sign.     Results for orders placed or performed in visit on 01/25/22   UA macro with reflex to Microscopic and Culture - Clinc Collect     Status: Abnormal    Specimen: Urine, Midstream "   Result Value Ref Range    Color Urine Yellow Colorless, Straw, Light Yellow, Yellow    Appearance Urine Clear Clear    Glucose Urine Negative Negative mg/dL    Bilirubin Urine Negative Negative    Ketones Urine Trace (A) Negative mg/dL    Specific Gravity Urine 1.025 1.003 - 1.035    Blood Urine Negative Negative    pH Urine 6.0 5.0 - 7.0    Protein Albumin Urine Negative Negative mg/dL    Urobilinogen Urine 0.2 0.2, 1.0 E.U./dL    Nitrite Urine Negative Negative    Leukocyte Esterase Urine Negative Negative    Narrative    Microscopic not indicated   HCG qualitative urine     Status: Normal   Result Value Ref Range    hCG Urine Qualitative Negative Negative   CBC with platelets     Status: Normal   Result Value Ref Range    WBC Count 5.8 4.0 - 11.0 10e3/uL    RBC Count 4.96 3.80 - 5.20 10e6/uL    Hemoglobin 13.6 11.7 - 15.7 g/dL    Hematocrit 43.1 35.0 - 47.0 %    MCV 87 78 - 100 fL    MCH 27.4 26.5 - 33.0 pg    MCHC 31.6 31.5 - 36.5 g/dL    RDW 11.2 10.0 - 15.0 %    Platelet Count 251 150 - 450 10e3/uL   Comprehensive metabolic panel     Status: Normal   Result Value Ref Range    Sodium 138 133 - 144 mmol/L    Potassium 3.9 3.4 - 5.3 mmol/L    Chloride 106 94 - 109 mmol/L    Carbon Dioxide (CO2) 28 20 - 32 mmol/L    Anion Gap 4 3 - 14 mmol/L    Urea Nitrogen 16 7 - 30 mg/dL    Creatinine 0.61 0.52 - 1.04 mg/dL    Calcium 9.8 8.5 - 10.1 mg/dL    Glucose 97 70 - 99 mg/dL    Alkaline Phosphatase 59 40 - 150 U/L    AST 14 0 - 45 U/L    ALT 21 0 - 50 U/L    Protein Total 8.4 6.8 - 8.8 g/dL    Albumin 4.5 3.4 - 5.0 g/dL    Bilirubin Total 0.5 0.2 - 1.3 mg/dL    GFR Estimate >90 >60 mL/min/1.73m2

## 2022-01-25 NOTE — PATIENT INSTRUCTIONS
"  *  Your abdominal pain may be due to your gall bladder or a gall stone within your gall bladder.     *  Labs tests to check the liver and kidneys.     *  Upper abdominal ultrasound to evaluate the gall bladder and liver.     *  Call me the next day after the ultrasound for the results and for further directions.      *  Until this issue it fully evaluated, try to avoid fatty foods as much as possible.  Try to keep the diet as bland as possible.  Fat in foods can signal the gall bladder to \"sqeeze\" causing more pain.     *  If the gall bladder is found to be the source of your pain, then it will need to be removed via surgery.  We will send you to a surgeon as indicated.     *  If you ever develop very severe, unrelenting pain, then go to the Emergency room immediately for an evaluation.        *  Start taking Prilosec OTC (omeprazole) 20 mg once per day for the next 2-4 weeks to treat possible gastritis in case the ultrasound shows no evidence for gall bladder disease.     "

## 2022-01-25 NOTE — TELEPHONE ENCOUNTER
Patient called the clinic asking for a letter saying she has an OV today with Dr. Kilgore. Letter composed and emailed to [employer]: [email or web address].

## 2022-01-31 ENCOUNTER — HOSPITAL ENCOUNTER (OUTPATIENT)
Dept: ULTRASOUND IMAGING | Facility: CLINIC | Age: 22
Discharge: HOME | End: 2022-01-31
Attending: INTERNAL MEDICINE | Admitting: INTERNAL MEDICINE
Payer: COMMERCIAL

## 2022-01-31 DIAGNOSIS — R10.11 ABDOMINAL PAIN, RIGHT UPPER QUADRANT: ICD-10-CM

## 2022-01-31 PROCEDURE — 76705 ECHO EXAM OF ABDOMEN: CPT

## 2022-02-03 ENCOUNTER — TELEPHONE (OUTPATIENT)
Dept: FAMILY MEDICINE | Facility: CLINIC | Age: 22
End: 2022-02-03
Payer: COMMERCIAL

## 2022-02-03 NOTE — TELEPHONE ENCOUNTER
Please contact the patient.      Her abdominal ultrasound results were totally normal, with no abnormalities seen in the liver, pancreas, or gallbladder.  Specifically no gallstones or obstruction was seen.    Given the normal labs, this normal ultrasound is reassuring and makes a gallbladder issue less likely.    The next most likely thing to explain her recent abdominal symptoms may be some mild gastritis/stomach irritation.    I recommend taking over-the-counter Prilosec/omeprazole, 20 mg twice per day for the next 2 to 4 weeks.    If the abdominal pain improves, then stop the omeprazole after a few weeks and observe.    If the abdominal discomfort continues, then follow-up with her usual primary care physician for repeat exam and consideration for further evaluation depending on her interim history, response omeprazole, and exam.

## 2022-03-24 ENCOUNTER — TELEPHONE (OUTPATIENT)
Dept: FAMILY MEDICINE | Facility: CLINIC | Age: 22
End: 2022-03-24
Payer: COMMERCIAL

## 2022-03-24 NOTE — TELEPHONE ENCOUNTER
Received notice from [facility] that pt has missed 2 notices for short interval follow-up (6 months). Called patient and left message to return call.     Any Anderson,  [facility]

## 2022-03-25 ENCOUNTER — E-VISIT (OUTPATIENT)
Dept: FAMILY MEDICINE | Facility: CLINIC | Age: 22
End: 2022-03-25
Payer: COMMERCIAL

## 2022-03-25 DIAGNOSIS — N91.1 SECONDARY AMENORRHEA: Primary | ICD-10-CM

## 2022-03-25 PROCEDURE — 99422 OL DIG E/M SVC 11-20 MIN: CPT | Performed by: PHYSICIAN ASSISTANT

## 2022-03-25 NOTE — TELEPHONE ENCOUNTER
Called pt again today and no answer. Left message once again for pt to return call.    Any Anderson,  [facility]

## 2022-03-30 ENCOUNTER — TELEPHONE (OUTPATIENT)
Dept: FAMILY MEDICINE | Facility: CLINIC | Age: 22
End: 2022-03-30
Payer: COMMERCIAL

## 2022-03-30 NOTE — TELEPHONE ENCOUNTER
See separate telephone encounter reaching out to pt from 3/30/22.   Clementina MONGE RN  [facility]

## 2022-03-30 NOTE — TELEPHONE ENCOUNTER
Patient Contact     Attempt # 1     Was call answered?   No   Left non-detailed message to call the clinic back at [phone].      On call back:      -See separate e-visit encounter. Arben Newsome recommended lab only appointment April 10th. Pt was asking to be seen sooner. Dr. Kilgore OK for same day slot this week. Attempted to call pt to explain lab only appointment after April 10th, see below from Arben. However, if pt needs to be seen before then Dr. Kilgore did give OK to use same day slot this week.     Nidia Kilgore MD  Ec Triage 20 hours ago (6:36 PM)     BD       Ok to use same day slot this week . Thanks         Vi Neswome PA-C Smith, Sam L Yesterday (11:27 AM)     Dr. Apolinar Woodruff is now out of the office as well.   I would suggest some hormone testing when you have gone at least 3 months without a period.   It sounds like April 10 will be 3 months since your last period.       I will place the orders for labs.  Please call and make a lab only appointment after April 10.      FAWAD Braden RN  [facility]

## 2022-03-31 NOTE — TELEPHONE ENCOUNTER
Patient Contact    Attempt # 2    Was call answered?  No.  Left message on voicemail with information to call triage back at [phone], option 2.     On call back:     See note below.  Marie Lugo RN

## 2022-04-01 NOTE — TELEPHONE ENCOUNTER
Patient Contact    Attempt # 3    Was call answered?  No.  Left message on voicemail with information to call triage back at [phone], option 2.     On call back:   Marie Lugo RN

## 2022-04-15 ENCOUNTER — LAB (OUTPATIENT)
Dept: LAB | Facility: CLINIC | Age: 22
End: 2022-04-15
Payer: COMMERCIAL

## 2022-04-15 DIAGNOSIS — N91.1 SECONDARY AMENORRHEA: ICD-10-CM

## 2022-04-15 LAB
ESTRADIOL SERPL-MCNC: 82 PG/ML
FSH SERPL-ACNC: 1.6 IU/L
PROLACTIN SERPL-MCNC: 14 UG/L (ref 3–27)

## 2022-04-15 PROCEDURE — 82670 ASSAY OF TOTAL ESTRADIOL: CPT

## 2022-04-15 PROCEDURE — 84443 ASSAY THYROID STIM HORMONE: CPT

## 2022-04-15 PROCEDURE — 36415 COLL VENOUS BLD VENIPUNCTURE: CPT

## 2022-04-15 PROCEDURE — 83001 ASSAY OF GONADOTROPIN (FSH): CPT

## 2022-04-15 PROCEDURE — 84146 ASSAY OF PROLACTIN: CPT

## 2022-04-16 LAB — TSH SERPL DL<=0.005 MIU/L-ACNC: 0.85 MU/L (ref 0.4–4)

## 2022-04-18 DIAGNOSIS — N91.1 SECONDARY AMENORRHEA: Primary | ICD-10-CM

## 2022-04-19 NOTE — RESULT ENCOUNTER NOTE
Cecilio    Your lab tests are complete and I have reviewed the results.     - Your lab results look great; everything is normal.  - I added a testosterone test as well, we will need to wait a few days for those results.        If you have any questions or concerns, please feel free to call or send a [employer] message.    Sincerely,  Arben Newsome PA-C

## 2022-07-02 ENCOUNTER — HEALTH MAINTENANCE LETTER (OUTPATIENT)
Age: 22
End: 2022-07-02

## 2022-08-24 ENCOUNTER — LAB (OUTPATIENT)
Dept: URGENT CARE | Facility: URGENT CARE | Age: 22
End: 2022-08-24
Attending: FAMILY MEDICINE
Payer: COMMERCIAL

## 2022-08-24 DIAGNOSIS — Z20.822 SUSPECTED 2019 NOVEL CORONAVIRUS INFECTION: ICD-10-CM

## 2022-08-24 LAB — SARS-COV-2 RNA RESP QL NAA+PROBE: NEGATIVE

## 2022-08-24 PROCEDURE — U0003 INFECTIOUS AGENT DETECTION BY NUCLEIC ACID (DNA OR RNA); SEVERE ACUTE RESPIRATORY SYNDROME CORONAVIRUS 2 (SARS-COV-2) (CORONAVIRUS DISEASE [COVID-19]), AMPLIFIED PROBE TECHNIQUE, MAKING USE OF HIGH THROUGHPUT TECHNOLOGIES AS DESCRIBED BY CMS-2020-01-R: HCPCS

## 2022-08-24 PROCEDURE — U0005 INFEC AGEN DETEC AMPLI PROBE: HCPCS

## 2022-10-19 ENCOUNTER — TELEPHONE (OUTPATIENT)
Dept: FAMILY MEDICINE | Facility: CLINIC | Age: 22
End: 2022-10-19

## 2022-10-19 ENCOUNTER — MYC MEDICAL ADVICE (OUTPATIENT)
Dept: FAMILY MEDICINE | Facility: CLINIC | Age: 22
End: 2022-10-19

## 2022-10-19 ASSESSMENT — ASTHMA QUESTIONNAIRES
QUESTION_1 LAST FOUR WEEKS HOW MUCH OF THE TIME DID YOUR ASTHMA KEEP YOU FROM GETTING AS MUCH DONE AT WORK, SCHOOL OR AT HOME: NONE OF THE TIME
ACT_TOTALSCORE: 25
QUESTION_5 LAST FOUR WEEKS HOW WOULD YOU RATE YOUR ASTHMA CONTROL: COMPLETELY CONTROLLED
QUESTION_2 LAST FOUR WEEKS HOW OFTEN HAVE YOU HAD SHORTNESS OF BREATH: NOT AT ALL
QUESTION_3 LAST FOUR WEEKS HOW OFTEN DID YOUR ASTHMA SYMPTOMS (WHEEZING, COUGHING, SHORTNESS OF BREATH, CHEST TIGHTNESS OR PAIN) WAKE YOU UP AT NIGHT OR EARLIER THAN USUAL IN THE MORNING: NOT AT ALL
QUESTION_4 LAST FOUR WEEKS HOW OFTEN HAVE YOU USED YOUR RESCUE INHALER OR NEBULIZER MEDICATION (SUCH AS ALBUTEROL): NOT AT ALL
ACT_TOTALSCORE: 25

## 2022-10-19 NOTE — TELEPHONE ENCOUNTER
Patient Quality Outreach      Summary:    Patient has the following on her problem list/HM:   Asthma review     No flowsheet data found.       Patient is due/failing the following:   Asthma  -  ACT needed    Type of outreach:    Sent [employer] message.    Questions for provider review:    None                                                                                                                                     Mac MCQUEEN CMA

## 2022-10-25 ENCOUNTER — E-VISIT (OUTPATIENT)
Dept: FAMILY MEDICINE | Facility: CLINIC | Age: 22
End: 2022-10-25
Payer: COMMERCIAL

## 2022-10-25 DIAGNOSIS — J01.90 ACUTE SINUSITIS, RECURRENCE NOT SPECIFIED, UNSPECIFIED LOCATION: Primary | ICD-10-CM

## 2022-10-25 PROCEDURE — 99421 OL DIG E/M SVC 5-10 MIN: CPT | Performed by: FAMILY MEDICINE

## 2022-10-26 RX ORDER — AMOXICILLIN 500 MG/1
500 CAPSULE ORAL 3 TIMES DAILY
Qty: 30 CAPSULE | Refills: 0 | Status: SHIPPED | OUTPATIENT
Start: 2022-10-26 | End: 2022-11-05

## 2022-10-26 NOTE — TELEPHONE ENCOUNTER
Provider E-Visit time total (minutes): 8 min    Hi,  I have  reviewed your notes, health history in your chart and medications         Your symptoms can be suggestive of         Viral cold  lingering onto sinusitis.    Sinus infection. . I have sent treatment to your pharmacy   We can try treatment  with amoxacillin.  Continue with  symptomatic treatment as well.     Hope you feel better   Make sure to do  follow up if problem     Thank you for allowing me to be involved in your health care and for choosing [facility].  If you have any questions or concerns please feel free to contact me, [phone] .  Nidia Kilgore MD

## 2022-10-26 NOTE — PATIENT INSTRUCTIONS
Thank you for choosing us for your care. I have placed an order for a prescription so that you can start treatment. View your full visit summary for details by clicking on the link below. Your pharmacist will able to address any questions you may have about the medication.     If you're not feeling better within 5-7 days, please schedule an appointment.  You can schedule an appointment right here in [facility], or call [phone]  If the visit is for the same symptoms as your eVisit, we'll refund the cost of your eVisit if seen within seven days.

## 2022-11-06 ENCOUNTER — APPOINTMENT (OUTPATIENT)
Dept: FAMILY MEDICINE | Facility: CLINIC | Age: 22
End: 2022-11-06
Payer: COMMERCIAL

## 2022-11-06 ENCOUNTER — E-VISIT (OUTPATIENT)
Dept: FAMILY MEDICINE | Facility: CLINIC | Age: 22
End: 2022-11-06
Payer: COMMERCIAL

## 2022-11-06 DIAGNOSIS — J32.9 CHRONIC SINUSITIS, UNSPECIFIED LOCATION: Primary | ICD-10-CM

## 2022-11-06 PROCEDURE — 99421 OL DIG E/M SVC 5-10 MIN: CPT | Performed by: FAMILY MEDICINE

## 2022-11-07 RX ORDER — DOXYCYCLINE 100 MG/1
100 CAPSULE ORAL 2 TIMES DAILY
Qty: 20 CAPSULE | Refills: 0 | Status: SHIPPED | OUTPATIENT
Start: 2022-11-07 | End: 2022-11-17

## 2022-11-07 NOTE — TELEPHONE ENCOUNTER
Provider E-Visit time total (minutes): 10      HI Cecilio    Sorry to hear that you are not feeling better yet  and you sinus infection has not completely resolved   I have reviewed your chart and health history   Here's some advise for you   We can try another round of antibiotic to see if taht resolves your symptoms completely   If you are worried about influenza - then you may need some testing although your symptoms  do not sound like  classic influenza at this . Amoxacillin you took recently  should have covered for strep any ways if you had any exposure concerns .     I would also recommend doing a follow up check in the clinic for ongoing problem,  so we can further evaluate and treat accordingly.     Hope you feel better   Thank you for allowing me to be involved in your health care and for choosing [facility].  If you have any questions or concerns please feel free to contact me, [phone] .  Nidia Kilgore MD

## 2022-11-07 NOTE — PATIENT INSTRUCTIONS
Sinusitis (Antibiotic Treatment)    The sinuses are air-filled spaces within the bones of the face. They connect to the inside of the nose. Sinusitis is an inflammation of the tissue that lines the sinuses. Sinusitis can occur during a cold. It can also happen due to allergies to pollens and other particles in the air. Sinusitis can cause symptoms of sinus congestion and a feeling of fullness. A sinus infection causes fever, headache, and facial pain. There is often green or yellow fluid draining from the nose or into the back of the throat (post-nasal drip). You have been given antibiotics to treat this condition.   Home care  Take the full course of antibiotics as instructed. Don't stop taking them, even when you feel better.  Drink plenty of water, hot tea, and other liquids as directed by the healthcare provider. This may help thin nasal mucus. It also may help your sinuses drain fluids.  Heat may help soothe painful areas of your face. Use a towel soaked in hot water. Or,  the shower and direct the warm spray onto your face. Using a vaporizer along with a menthol rub at night may also help soothe symptoms.   An expectorant with guaifenesin may help thin nasal mucus and help your sinuses drain fluids. Talk with your provider or pharmacists before taking an over-the-counter (OTC) medicine if you have any questions about it or its side effects..  You can use an OTC decongestant, unless a similar medicine was prescribed to you. Nasal sprays work the fastest. Use one that contains phenylephrine or oxymetazoline. First blow your nose gently. Then use the spray. Don't use these medicines more often than directed on the label. If you do, your symptoms may get worse. You may also take pills that contain pseudoephedrine. Don t use products that combine multiple medicines. This is because side effects may be increased. Read labels. You can also ask the pharmacist for help. (People with high blood pressure  should not use decongestants. They can raise blood pressure.) Talk with your provider or pharmacist if you have any questions about the medicine..  OTC antihistamines may help if allergies contributed to your sinusitis. Talk with your provider or pharmacist if you have any questions about the medicine..  Don't use nasal rinses or irrigation during an acute sinus infection, unless your healthcare provider tells you to. Rinsing may spread the infection to other areas in your sinuses.  Use acetaminophen or ibuprofen to control pain, unless another pain medicine was prescribed to you. If you have chronic liver or kidney disease or ever had a stomach ulcer, talk with your healthcare provider before using these medicines. Never give aspirin to anyone under age 18 who is ill with a fever. It may cause severe liver damage.  Don't smoke. This can make symptoms worse.    Follow-up care  Follow up with your healthcare provider, or as advised.   When to seek medical advice  Call your healthcare provider if any of these occur:   Facial pain or headache that gets worse  Stiff neck  Unusual drowsiness or confusion  Swelling of your forehead or eyelids  Symptoms don't go away in 10 days  Vision problems, such as blurred or double vision  Fever of 100.4 F (38 C) or higher, or as directed by your healthcare provider  Call 911  Call 911 if any of these occur:   Seizure  Trouble breathing  Feeling dizzy or faint  Fingernails, skin or lips look blue, purple , or gray  Prevention  Here are steps you can take to help prevent an infection:   Keep good hand washing habits.  Don t have close contact with people who have sore throats, colds, or other upper respiratory infections.  Don t smoke, and stay away from secondhand smoke.  Stay up to date with of your vaccines.  [employer] last reviewed this educational content on 12/1/2019 2000-2021 [employer]. All rights reserved. This information is not intended as a substitute for  professional medical care. Always follow your healthcare professional's instructions.        Dear Ayana Hollins    After reviewing your responses, I've been able to diagnose you with?a sinus infection caused by bacteria.?     Based on your responses and diagnosis, I have prescribed doxycycline  to treat your symptoms. I have sent this to your pharmacy.?     It is also important to stay well hydrated, get lots of rest and take over-the-counter decongestants,?tylenol?or ibuprofen if you?are able to?take those medications per your primary care provider to help relieve discomfort.?     It is important that you take?all of?your prescribed medication even if your symptoms are improving after a few doses.? Taking?all of?your medicine helps prevent the symptoms from returning.?     If your symptoms worsen, you develop severe headache, vomiting, high fever (>102), or are not improving in 7 days, please contact your primary care provider for an appointment or visit any of our convenient Walk-in Care or Urgent Care Centers to be seen which can be found on our website?here.?     Thanks again for choosing?us?as your health care partner,?   ?  Nidia Kilgore MD?

## 2022-11-11 ENCOUNTER — IMMUNIZATION (OUTPATIENT)
Dept: NURSING | Facility: CLINIC | Age: 22
End: 2022-11-11
Payer: COMMERCIAL

## 2022-11-11 PROCEDURE — 91312 COVID-19,PF,PFIZER BOOSTER BIVALENT: CPT

## 2022-11-11 PROCEDURE — 0124A COVID-19,PF,PFIZER BOOSTER BIVALENT: CPT

## 2023-01-07 ENCOUNTER — HEALTH MAINTENANCE LETTER (OUTPATIENT)
Age: 23
End: 2023-01-07

## 2023-01-16 ENCOUNTER — NURSE TRIAGE (OUTPATIENT)
Dept: FAMILY MEDICINE | Facility: CLINIC | Age: 23
End: 2023-01-16

## 2023-01-16 ENCOUNTER — ALLIED HEALTH/NURSE VISIT (OUTPATIENT)
Dept: NURSING | Facility: CLINIC | Age: 23
End: 2023-01-16
Payer: COMMERCIAL

## 2023-01-16 VITALS
HEART RATE: 73 BPM | RESPIRATION RATE: 16 BRPM | TEMPERATURE: 97.9 F | DIASTOLIC BLOOD PRESSURE: 72 MMHG | OXYGEN SATURATION: 100 % | SYSTOLIC BLOOD PRESSURE: 103 MMHG

## 2023-01-16 DIAGNOSIS — S69.90XA FINGER INJURY: Primary | ICD-10-CM

## 2023-01-16 PROCEDURE — 99207 PR NO CHARGE NURSE ONLY: CPT

## 2023-01-16 ASSESSMENT — PAIN SCALES - GENERAL: PAINLEVEL: SEVERE PAIN (6)

## 2023-01-16 NOTE — PROGRESS NOTES
Please see telephone encounter 1/16/23.    Arabella URIAS RN  [facility] Triage Team

## 2023-01-16 NOTE — TELEPHONE ENCOUNTER
"See below    Reason for Disposition    SEVERE pain (e.g., excruciating)    Additional Information    Negative: Major bleeding (actively dripping or spurting) that can't be stopped    Negative: Sounds like a life-threatening emergency to the triager    Negative: Wound looks infected    Negative: Caused by animal bite    Negative: Amputation    Negative: High-pressure injection injury (e.g., from paint gun, usually work-related)    Negative: Looks like a broken bone or dislocated joint (e.g., crooked or deformed)    Negative: Skin is split open or gaping (length > 1/2 inch or 12 mm)    Negative: Cut or scrape is very deep (e.g., can see bone or tendons)    Negative: Bleeding won't stop after 10 minutes of direct pressure (using correct technique)    Negative: Dirt in the wound and not removed after 15 minutes of scrubbing    Negative: Cut with numbness (loss of sensation) of finger    Negative: Fingernail is partially torn from a crush injury  (Exception: Torn nail from catching it on something.)    Negative: Sounds like a serious injury to the triager    Negative: Base of fingernail has popped out from under skin fold (cuticle)    Negative: Looks infected (e.g., spreading redness, red streak, pus)    Negative: Fingernail is completely torn off    Answer Assessment - Initial Assessment Questions  1. MECHANISM: \"How did the injury happen?\"         Reaching for weater and the ring on the finger go caught on the bar. Ring came off when it got caught.    2. ONSET: \"When did the injury happen?\" (Minutes or hours ago)         30 minutes ago    3. LOCATION: \"What part of the finger is injured?\" \"Is the nail damaged?\"         Bottom of left ring finger    4. APPEARANCE of the INJURY: \"What does the injury look like?\"         Blood blister, blood, swollen    5. SEVERITY: \"Can you use the hand normally?\"  \"Can you bend your fingers into a ball and then fully open them?\"        No     6. SIZE: For cuts, bruises, or swelling, " "ask: \"How large is it?\" (e.g., inches or centimeters;  entire finger)         1 cm x 1 cm blood blister    7. PAIN: \"Is there pain?\" If Yes, ask: \"How bad is the pain?\"    (e.g., Scale 1-10; or mild, moderate, severe)   - NONE (0): no pain.   - MILD (1-3): doesn't interfere with normal activities.    - MODERATE (4-7): interferes with normal activities or awakens from sleep.   - SEVERE (8-10): excruciating pain, unable to hold a glass of water or bend finger even a little.        6/10 when not moving it and 10/10 when moving it    8. TETANUS: For any breaks in the skin, ask: \"When was the last tetanus booster?\"        Unsure    9. OTHER SYMPTOMS: \"Do you have any other symptoms?\"        Pain    10. PREGNANCY: \"Is there any chance you are pregnant?\" \"When was your last menstrual period?\"          No, January 2    Protocols used: FINGER INJURY-A-OH    "

## 2023-01-16 NOTE — TELEPHONE ENCOUNTER
"Patient walked into the clinic with a cut/blood blister on her ring finger. Patient stated that she was getting a sweater out of her closet when her ring got caught on the john. The ring got pulled off of her left ring finger. Patient had a 0.5 x 1 cm blood blister that formed at the bottom of her left ring finger. Patient rated the pain 6/10 and stated that it hurts to much to move it. Patient also stated that it felt kind of numb in the area of the blood blister, but no where else. Patient's vital signs were oxygen 100%, pulse 73, blood pressure 103/72, temperature 97.9 and respiration rat 16. Huddled with a provider that agreed the patient does not need to be seen at this time since the numbness is only where the blood blister is. Patient's finger was covered and kenzie taped per provider's recommendations. Informed the patient on signs and symptoms of infection and when she should be seen. Patient stated understanding of the teaching and had no further questions at this time.     Reason for Disposition    Cut with numbness (loss of sensation) of finger    Additional Information    Negative: Major bleeding (actively dripping or spurting) that can't be stopped    Negative: Sounds like a life-threatening emergency to the triager    Negative: Wound looks infected    Negative: Caused by animal bite    Negative: Amputation    Negative: High-pressure injection injury (e.g., from paint gun, usually work-related)    Negative: Looks like a broken bone or dislocated joint (e.g., crooked or deformed)    Negative: Skin is split open or gaping (length > 1/2 inch or 12 mm)    Negative: Cut or scrape is very deep (e.g., can see bone or tendons)    Negative: Bleeding won't stop after 10 minutes of direct pressure (using correct technique)    Negative: Dirt in the wound and not removed after 15 minutes of scrubbing    Answer Assessment - Initial Assessment Questions  1. MECHANISM: \"How did the injury happen?\"         Reaching for " "weater and the ring on the finger go caught on the bar. Ring came off when it got caught.    2. ONSET: \"When did the injury happen?\" (Minutes or hours ago)         30 minutes ago    3. LOCATION: \"What part of the finger is injured?\" \"Is the nail damaged?\"         Bottom of left ring finger    4. APPEARANCE of the INJURY: \"What does the injury look like?\"         Blood blister, blood, swollen    5. SEVERITY: \"Can you use the hand normally?\"  \"Can you bend your fingers into a ball and then fully open them?\"        No     6. SIZE: For cuts, bruises, or swelling, ask: \"How large is it?\" (e.g., inches or centimeters;  entire finger)         1 cm x 1 cm blood blister    7. PAIN: \"Is there pain?\" If Yes, ask: \"How bad is the pain?\"    (e.g., Scale 1-10; or mild, moderate, severe)   - NONE (0): no pain.   - MILD (1-3): doesn't interfere with normal activities.    - MODERATE (4-7): interferes with normal activities or awakens from sleep.   - SEVERE (8-10): excruciating pain, unable to hold a glass of water or bend finger even a little.        6/10 when not moving it and 10/10 when moving it    8. TETANUS: For any breaks in the skin, ask: \"When was the last tetanus booster?\"        Unsure    9. OTHER SYMPTOMS: \"Do you have any other symptoms?\"        Pain    10. PREGNANCY: \"Is there any chance you are pregnant?\" \"When was your last menstrual period?\"          No, January 2    Protocols used: FINGER INJURY-A-OH    SEE IN OFFICE TODAY:   * You need to be examined today. Let me give you an appointment.  * IF NO AVAILABLE APPOINTMENTS: You need to be seen in the Urgent Care Center. Go to the one at ____________. Go there today. A nearby Urgent Care Center is often a good source of care. Another choice is to go to the Emergency Department.      FIRST AID ADVICE FOR BLEEDING: Apply direct pressure to the entire wound with a clean cloth.    FIRST AID ADVICE FOR PENETRATING OBJECT: If penetrating object still in place, don't remove " it (Reason: removal could increase bleeding).    FIRST AID ADVICE FOR SHOCK: Lie down with feet elevated.    FIRST AID ADVICE FOR A SPRAIN OF THE FINGER:  * Remove any rings or jewelry from the injured finger.  * Tape the injured finger to the finger next to it (this is called a buddy splint).  * Apply a cold pack or an ice bag (wrapped in a moist towel) to the area for 20 minutes.    FIRST AID ADVICE FOR SUSPECTED FINGER FRACTURE (BROKEN BONE) OR DISLOCATION (OUT OF JOINT):  * Remove any rings or jewelry from the injured finger.  * Tape the injured finger to the finger next to it (this is called a buddy splint).  * Apply a cold pack or an ice bag (wrapped in a moist towel) to the area for 20 minutes.    FIRST AID ADVICE FOR TRANSPORT OF AN AMPUTATED FINGER:  * Briefly rinse amputated part with water (to remove any dirt).  * Place amputated part in plastic bag (to protect and keep clean).  * Place plastic bag containing part in a cup of ice water (to keep cool and preserve tissue).      REASSURANCE AND EDUCATION - SMALL CUT OR SCRAPE:  * It sounds like a small cut or scrape that we can treat at home.  * Here is some care advice that should help.      PAIN MEDICINES:  * For pain relief, you can take either acetaminophen, ibuprofen, or naproxen.  * They are over-the-counter (OTC) pain drugs. You can buy them at the drugstore.  * ACETAMINOPHEN - REGULAR STRENGTH TYLENOL: Take 650 mg (two 325 mg pills) by mouth every 4 to 6 hours as needed. Each Regular Strength Tylenol pill has 325 mg of acetaminophen. The most you should take each day is 3,250 mg (10 pills a day).   * ACETAMINOPHEN - EXTRA STRENGTH TYLENOL: Take 1,000 mg (two 500 mg pills) every 8 hours as needed. Each Extra Strength Tylenol pill has 500 mg of acetaminophen. The most you should take each day is 3,000 mg (6 pills a day).  * IBUPROFEN (E.G., MOTRIN, ADVIL): Take 400 mg (two 200 mg pills) by mouth every 6 hours. The most you should take each day is 1,200  mg (six 200 mg pills), unless your doctor has told you to take more.  * NAPROXEN (E.G., ALEVE): Take 220 mg (one 220 mg pill) by mouth every 8 to 12 hours as needed. You may take 440 mg (two 220 mg pills) for your first dose. The most you should take each day is 660 mg (three 220 mg pills a day), unless your doctor has told you to take more.      REASSURANCE AND EDUCATION - BRUISED FINGER (CONTUSION, BRUISE):  * A direct blow to your finger can cause a contusion. Contusion is the medical term for bruise.  * Symptoms are mild pain, swelling, and/or bruising.  * Here is some care advice that should help.      CLEANING A CUT OR SCRAPE:  * Wash the wound with soap and water.  * For any dirt, scrub gently with a washcloth.  * Bleeding: Put direct pressure on the wound for 10 minutes to stop any bleeding. Place a clean cloth or gauze pad over the wound. Press down firmly with your fingers over the bleeding area.      USE A COLD PACK FOR PAIN, SWELLING, OR BRUISING:  * Put a cold pack or an ice bag (wrapped in a moist towel) on the area for 20 minutes.  * Repeat in 1 hour, then every 4 hours while awake.  * Continue this for the first 48 hours (2 days) after an injury.  * This will help decrease pain, swelling, and bruising.  * Caution: Avoid frostbite.      ANTIBIOTIC OINTMENT FOR A CUT OR SCRAPE:  * Put a small amount of antibiotic ointment on the wound once a day for 3 days.  * You can get this over-the-counter (OTC) at a drugstore.  * Use Bacitracin ointment (OTC in [place] or Polysporin ointment (OTC in [place]) or one that you already have.  * Read the package instructions on all medicines that you use.      USE HEAT ON AREA AFTER 48 HOURS:  * If pain, swelling, or bruising last more than 48 hours (2 days), then use heat on the area.  * Use a heat pack, heating pad, or warm wet washcloth.  * Do this for 10 minutes three times a day.  * This will help increase blood flow and improve healing.  * Caution: Avoid burn. Do  not sleep on a heating pad.      DRESSING A CUT OR SCRAPE:  * Cover the wound with a dressing.  * Use a sterile gauze (held in place with paper tape) or an adhesive bandage (such as a Band-Aid).      KEEP THE HAND ELEVATED:  * When you are up walking around, try not to let the hand hang down. If you do you will notice that the finger hurts and throbs more.   * When you are sitting down reading or watching TV, place your arm up on a pillow. This puts (elevates) the hand above the heart.  * This can also help decrease swelling and pain.      DELILAH-TAPE SPLINTING:  * Protect your injured finger by 'delilah-taping' it to the next finger.  * Use 2 pieces of tape. Place one around the base of both fingers. Place the other around the ends of both fingers.      REST VS. MOVEMENT:  * Movement is generally more healing in the long term than rest.  * Continue normal activities as much as your pain permits.  * If it really hurts to use the finger at all, you will need to see the doctor.      CALL BACK IF:  * Pain becomes severe  * Pain does not improve after 3 days  * Pain or swelling lasts more than 2 weeks  * You become worse        Patient/Caregiver understands and will follow care advice? Other, see documentation     Arabella MCQUEEN [facility] Triage Team

## 2023-01-24 ENCOUNTER — OFFICE VISIT (OUTPATIENT)
Dept: FAMILY MEDICINE | Facility: CLINIC | Age: 23
End: 2023-01-24
Payer: COMMERCIAL

## 2023-01-24 ENCOUNTER — HOSPITAL ENCOUNTER (OUTPATIENT)
Dept: GENERAL RADIOLOGY | Facility: CLINIC | Age: 23
Discharge: HOME | End: 2023-01-24
Payer: COMMERCIAL

## 2023-01-24 VITALS
HEIGHT: 59 IN | SYSTOLIC BLOOD PRESSURE: 86 MMHG | DIASTOLIC BLOOD PRESSURE: 58 MMHG | RESPIRATION RATE: 20 BRPM | WEIGHT: 85 LBS | OXYGEN SATURATION: 100 % | BODY MASS INDEX: 17.14 KG/M2 | HEART RATE: 82 BPM | TEMPERATURE: 99.2 F

## 2023-01-24 DIAGNOSIS — S69.91XA INJURY OF FINGER OF RIGHT HAND, INITIAL ENCOUNTER: ICD-10-CM

## 2023-01-24 DIAGNOSIS — Z12.4 CERVICAL CANCER SCREENING: ICD-10-CM

## 2023-01-24 DIAGNOSIS — S69.91XA INJURY OF FINGER OF RIGHT HAND, INITIAL ENCOUNTER: Primary | ICD-10-CM

## 2023-01-24 DIAGNOSIS — Z11.3 SCREENING FOR STDS (SEXUALLY TRANSMITTED DISEASES): ICD-10-CM

## 2023-01-24 PROCEDURE — 99213 OFFICE O/P EST LOW 20 MIN: CPT

## 2023-01-24 PROCEDURE — 73140 X-RAY EXAM OF FINGER(S): CPT | Mod: RT

## 2023-01-24 ASSESSMENT — PAIN SCALES - GENERAL: PAINLEVEL: MODERATE PAIN (4)

## 2023-01-24 ASSESSMENT — PATIENT HEALTH QUESTIONNAIRE - PHQ9
10. IF YOU CHECKED OFF ANY PROBLEMS, HOW DIFFICULT HAVE THESE PROBLEMS MADE IT FOR YOU TO DO YOUR WORK, TAKE CARE OF THINGS AT HOME, OR GET ALONG WITH OTHER PEOPLE: SOMEWHAT DIFFICULT
SUM OF ALL RESPONSES TO PHQ QUESTIONS 1-9: 16
SUM OF ALL RESPONSES TO PHQ QUESTIONS 1-9: 16

## 2023-01-24 NOTE — PROGRESS NOTES
Assessment & Plan     Injury of finger of right hand, initial encounter  - Orthopedic  Referral; Future  - XR Finger Right G/E 2 Views; Future  Based on physical exam, I am concerned about a potential extender tendon injury.  No mallet finger or boutonniere deformity noted, however, her finger appears to be stuck in flexion below the interphalangeal joint about 10 degrees.  Less likely to be fracture given the absence of bony tenderness, however, we will perform a x-ray to rule out a fracture today.  Patient reports she is leaving for [place] on Thursday.  I advised her that I would like her to follow-up with hand specialist prior to her trip if possible.    Return in about 2 days (around 1/26/2023).    Randall Ramesh NP  [facility]    [place]   Cecilio is a 22 year old, presenting for the following health issues:  Finger injury  Patient was standing on her laundry basket trying to reach a sweater, and fell off the laundry basketball, and the ring on her right ring finger was caught on a bar, which ripped her ring off her hand. She reports the previous wound as healed, but the range of motion is not improving, having trouble straightening her finger.  finger. She sometimes has tingling at the base of the 5th/4th digits of her right hand.  This happened on Monday of last week. She has been trying warm compresses and has been trying kenzie. She reports the wound is healing but the finger is still staying the same.    HPI     Concern - R finger injury  Onset: 1/16/2023  Description: patient fell and injured R ring finger  Intensity: moderate  Progression of Symptoms:  same  Accompanying Signs & Symptoms: finger is swollen and unable to bend  Previous history of similar problem: n/a  Precipitating factors:        Worsened by: trying to bend the finger  Alleviating factors:        Improved by: warm compress  Therapies tried and outcome: nothing aside from warm compresses    Review of  "Systems   Constitutional, HEENT, cardiovascular, pulmonary, gi and gu systems are negative, except as otherwise noted.      Objective    BP (!) 86/58 (BP Location: Right arm, Patient Position: Sitting, Cuff Size: Adult Small)   Pulse 82   Temp 99.2  F (37.3  C) (Temporal)   Resp 20   Ht 1.505 m (4' 11.25\")   Wt 38.6 kg (85 lb)   LMP 01/02/2023 (Exact Date)   SpO2 100%   BMI 17.02 kg/m    Body mass index is 17.02 kg/m .  Physical Exam   GENERAL: healthy, alert and no distress  MS: Right 4th digit has ecchymosis and edematous. Patient unable to do finger extension, and finger stuck in flexion about 10 degrees below parallel.   SKIN: Cap refill intact and skin warm to the touch.  PSYCH: mentation appears normal, affect normal/bright      Answers for HPI/ROS submitted by the patient on 1/24/2023  If you checked off any problems, how difficult have these problems made it for you to do your work, take care of things at home, or get along with other people?: Somewhat difficult  PHQ9 TOTAL SCORE: 16      "

## 2023-03-10 ENCOUNTER — MYC MEDICAL ADVICE (OUTPATIENT)
Dept: FAMILY MEDICINE | Facility: CLINIC | Age: 23
End: 2023-03-10
Payer: COMMERCIAL

## 2023-03-13 NOTE — TELEPHONE ENCOUNTER
Patient Contact    Attempt # 1    Was call answered?  No.  Left message on voicemail with information to call me back.    On call back, please Triage patient symptoms. [employer] message was also sent advising patient to go to ED for evaluation if symptoms have continued.

## 2023-03-14 NOTE — TELEPHONE ENCOUNTER
Patient Contact    Attempt # 2    Was Call answered? No    Non-detailed voicemail left to call clinic at: [phone].     On Call Back:    Please see [employer] messages and note below.    Arabella URIAS RN  [facility] Triage Team

## 2023-03-16 NOTE — TELEPHONE ENCOUNTER
Patient Contact    Attempt # 3    Was call answered?  No.  Left message on voicemail with information to call triage back at [phone], option 2.     On call back: triage facial stiffness. Patient has not been seen through [facility] for these symptoms. [employer] message was sent 6 days ago.  Marie Lugo RN

## 2023-03-22 ENCOUNTER — OFFICE VISIT (OUTPATIENT)
Dept: FAMILY MEDICINE | Facility: CLINIC | Age: 23
End: 2023-03-22
Payer: COMMERCIAL

## 2023-03-22 VITALS
DIASTOLIC BLOOD PRESSURE: 60 MMHG | WEIGHT: 87 LBS | HEART RATE: 88 BPM | SYSTOLIC BLOOD PRESSURE: 100 MMHG | TEMPERATURE: 98 F | RESPIRATION RATE: 16 BRPM | BODY MASS INDEX: 17.54 KG/M2 | OXYGEN SATURATION: 99 % | HEIGHT: 59 IN

## 2023-03-22 DIAGNOSIS — Z12.4 CERVICAL CANCER SCREENING: ICD-10-CM

## 2023-03-22 DIAGNOSIS — Z72.51 UNPROTECTED SEX: Primary | ICD-10-CM

## 2023-03-22 DIAGNOSIS — N93.9 VAGINAL SPOTTING: ICD-10-CM

## 2023-03-22 DIAGNOSIS — R00.2 PALPITATIONS: ICD-10-CM

## 2023-03-22 DIAGNOSIS — K13.79 MOUTH SORE: ICD-10-CM

## 2023-03-22 DIAGNOSIS — Z11.3 SCREEN FOR STD (SEXUALLY TRANSMITTED DISEASE): ICD-10-CM

## 2023-03-22 LAB
HBV SURFACE AB SERPL IA-ACNC: 4.05 M[IU]/ML
HBV SURFACE AB SERPL IA-ACNC: NONREACTIVE M[IU]/ML
HBV SURFACE AG SERPL QL IA: NONREACTIVE
HCG UR QL: NEGATIVE
HCV AB SERPL QL IA: NONREACTIVE
HIV 1+2 AB+HIV1 P24 AG SERPL QL IA: NONREACTIVE
T PALLIDUM AB SER QL: NONREACTIVE

## 2023-03-22 PROCEDURE — 81025 URINE PREGNANCY TEST: CPT | Performed by: PHYSICIAN ASSISTANT

## 2023-03-22 PROCEDURE — 87591 N.GONORRHOEAE DNA AMP PROB: CPT | Mod: 59 | Performed by: PHYSICIAN ASSISTANT

## 2023-03-22 PROCEDURE — 36415 COLL VENOUS BLD VENIPUNCTURE: CPT | Performed by: PHYSICIAN ASSISTANT

## 2023-03-22 PROCEDURE — 86780 TREPONEMA PALLIDUM: CPT | Performed by: PHYSICIAN ASSISTANT

## 2023-03-22 PROCEDURE — 87389 HIV-1 AG W/HIV-1&-2 AB AG IA: CPT | Performed by: PHYSICIAN ASSISTANT

## 2023-03-22 PROCEDURE — 99214 OFFICE O/P EST MOD 30 MIN: CPT | Performed by: PHYSICIAN ASSISTANT

## 2023-03-22 PROCEDURE — 87529 HSV DNA AMP PROBE: CPT | Performed by: PHYSICIAN ASSISTANT

## 2023-03-22 PROCEDURE — 87491 CHLMYD TRACH DNA AMP PROBE: CPT | Performed by: PHYSICIAN ASSISTANT

## 2023-03-22 PROCEDURE — 83735 ASSAY OF MAGNESIUM: CPT | Performed by: PHYSICIAN ASSISTANT

## 2023-03-22 PROCEDURE — 84443 ASSAY THYROID STIM HORMONE: CPT | Performed by: PHYSICIAN ASSISTANT

## 2023-03-22 PROCEDURE — 86803 HEPATITIS C AB TEST: CPT | Performed by: PHYSICIAN ASSISTANT

## 2023-03-22 PROCEDURE — 86706 HEP B SURFACE ANTIBODY: CPT | Performed by: PHYSICIAN ASSISTANT

## 2023-03-22 PROCEDURE — 87340 HEPATITIS B SURFACE AG IA: CPT | Performed by: PHYSICIAN ASSISTANT

## 2023-03-22 PROCEDURE — 80053 COMPREHEN METABOLIC PANEL: CPT | Performed by: PHYSICIAN ASSISTANT

## 2023-03-22 PROCEDURE — G0145 SCR C/V CYTO,THINLAYER,RESCR: HCPCS | Performed by: PHYSICIAN ASSISTANT

## 2023-03-22 NOTE — PROGRESS NOTES
Assessment & Plan     Ayana was seen today for mouth problem.    Diagnoses and all orders for this visit:    Unprotected sex - vaginal and oral   -     HCG qualitative urine; Future  -     Chlamydia trachomatis PCR  -     Neisseria gonorrhoeae PCR  -     Chlamydia trachomatis PCR  -     Neisseria gonorrhoeae PCR  -     Herpes Simplex Virus 1&2 by PCR  -     HIV Antigen Antibody Combo; Future  -     Treponema Abs w Reflex to RPR and Titer; Future  -     Hepatitis C Screen Reflex to HCV RNA Quant and Genotype; Future  -     Hepatitis B surface antigen; Future  -     Hepatitis B Surface Antibody; Future    Vaginal spotting  -     HCG qualitative urine; Future    Mouth sore  -     Herpes Simplex Virus 1&2 by PCR    Screen for STD (sexually transmitted disease)  -     Chlamydia trachomatis PCR  -     Neisseria gonorrhoeae PCR  -     Chlamydia trachomatis PCR  -     Neisseria gonorrhoeae PCR  -     Herpes Simplex Virus 1&2 by PCR  -     HIV Antigen Antibody Combo; Future  -     Treponema Abs w Reflex to RPR and Titer; Future  -     Hepatitis C Screen Reflex to HCV RNA Quant and Genotype; Future  -     Hepatitis B surface antigen; Future  -     Hepatitis B Surface Antibody; Future    Cervical cancer screening  -     Pap screen only - recommended age 21 - 24 years    New male partner on 3/12 with unprotected vaginal and oral sex. Will screen for STD and also obtained HSV PCR for oral lesion. Unfortunately, outside timeframe where antivirals are of benefit. No progressive or new lesion. DDX includes canker sore and pt has history of these in past that feel similarly. Obtained pap as due during pelvic exam today. No fever, abdominal tenderness or CMT. Thus, not c/w PID at this time. Reviewed red flags to watch for that warrant emergent evaluation for worsening. Otherwise, follow-up pending labs and encouraged safe sex in future. Encouraged to return for routine physical as well for overall preventative health. Patient in  "agreement with plan.       FAWAD Hassan [facility]   Cecilio is a 22 year old, presenting for the following health issues:  Mouth Problem  No flowsheet data found.  History of Present Illness       Reason for visit:  I have an increased heart rate and I d like to check for any std/sti s because I engaged in unprotected oral/vaginal sex (male partner)  Symptom onset:  1-2 weeks ago; 3/12.  Took plan B next day - on 3/14 she developed increased HR with SOB. This occurs intermittently throughout the day, but mostly notices in the morning and at night. Rare tight chest sensation (happened once, but denies CP otherwise). Denies palpitations.   Denies any fever, URI sx, sore throat, cough.  Occasional L-sided lower abdominal pain - occurs maybe 1x per day. Lasts 5 min then resolves. Describes as dull ache. Admits to constipation x4 days - normally stools 2x per day and has decreased to 1x or every other day.   No diarrhea, hematuria, dysuria, urinary frequency, stones or UTIs.  Endorses vaginal discharge \"kind of weird\" - more brown, sticky, looks like a blood clot. After took plan B she did have menstrual spotting for 3 days 3/18-20. Regular periods otherwise. LMP: 3/4  Sores in mouth - onset 3/17. Initially attributed to biting her tongue since she does this often. Feels like canker sore and has had these in the past. 1 roof of mouth, R cheek and L side of tongue.   No previous hx of HSV.   No contraception    Symptoms include:  Increased heart rate began 1-2 weeks ago and feels like its getting worse. short of breath, sores inside mouth began 5 days after unprotected oral sex, dull discomfort in lower regions  Symptom intensity:  Moderate  Symptom progression:  Worsening  Had these symptoms before:  No  What makes it worse:  My increased heart rate is the worst. Sends me in a spiral making me think I m going to die. Its the worst in the morning and at night  What makes it " "better:  Nothing    She eats 2-3 servings of fruits and vegetables daily.She consumes 1 sweetened beverage(s) daily.She exercises with enough effort to increase her heart rate 9 or less minutes per day.  She exercises with enough effort to increase her heart rate 3 or less days per week.   She is taking medications regularly.     No current outpatient medications on file.     No current facility-administered medications for this visit.      No Known Allergies      Review of Systems   Constitutional, HEENT, cardiovascular, pulmonary, gi and gu, skin systems are negative, except as otherwise noted.      Objective    /60 (BP Location: Right arm, Patient Position: Chair, Cuff Size: Adult Regular)   Pulse 88   Temp 98  F (36.7  C) (Tympanic)   Resp 16   Ht 1.505 m (4' 11.25\")   Wt 39.5 kg (87 lb)   LMP 01/02/2023 (Exact Date)   SpO2 99%   BMI 17.42 kg/m    Body mass index is 17.42 kg/m .  Physical Exam   GENERAL: healthy, alert and no distress  EYES: Eyes grossly normal to inspection, PERRL and conjunctivae and sclerae normal  HENT: normal cephalic/atraumatic, ear canals and TM's normal. Mucous membranes moist. 1 small shallow ulcer noted along R junction of anterior pillar and soft palate and smaller appearance of same along L lateral tongue - more redness/irritation though and cannot distinguish chandra discernable ulcer. No pharynx erythema or exudates.   NECK: no adenopathy and no asymmetry, masses, or scars  RESP: lungs clear to auscultation - no rales, rhonchi or wheezes  CV: regular rates and rhythm, no murmur, click or rub and no peripheral edema  ABDOMEN: soft, nontender, no hepatosplenomegaly, no masses and bowel sounds normal   (female): normal female external genitalia, normal urethral meatus, vaginal mucosa, normal cervix/adnexa/uterus without masses or CMT. Mild brownish red discharge seen in vaginal vault most c/w old blood.  PSYCH: mentation appears normal, affect normal/bright    Pending " labs

## 2023-03-22 NOTE — RESULT ENCOUNTER NOTE
Dear Cecilio,      Your recent test results are noted below:    Pregnancy test is negative. Will await remainder of labs.    For additional lab test information, [email or web address] is an excellent reference. Please contact the clinic at [phone] with any further questions or concerns.    Sincerely,      Jaenne Lloyd PA-C  [facility]

## 2023-03-23 ENCOUNTER — MYC MEDICAL ADVICE (OUTPATIENT)
Dept: FAMILY MEDICINE | Facility: CLINIC | Age: 23
End: 2023-03-23

## 2023-03-23 ENCOUNTER — OFFICE VISIT (OUTPATIENT)
Dept: FAMILY MEDICINE | Facility: CLINIC | Age: 23
End: 2023-03-23
Payer: COMMERCIAL

## 2023-03-23 VITALS
RESPIRATION RATE: 16 BRPM | OXYGEN SATURATION: 99 % | DIASTOLIC BLOOD PRESSURE: 50 MMHG | SYSTOLIC BLOOD PRESSURE: 94 MMHG | WEIGHT: 87 LBS | BODY MASS INDEX: 17.42 KG/M2 | TEMPERATURE: 98.7 F | HEART RATE: 60 BPM

## 2023-03-23 DIAGNOSIS — Z23 NEED FOR HPV VACCINE: ICD-10-CM

## 2023-03-23 DIAGNOSIS — F43.23 ADJUSTMENT DISORDER WITH MIXED ANXIETY AND DEPRESSED MOOD: ICD-10-CM

## 2023-03-23 DIAGNOSIS — R00.2 PALPITATIONS: Primary | ICD-10-CM

## 2023-03-23 DIAGNOSIS — F33.1 MAJOR DEPRESSIVE DISORDER, RECURRENT EPISODE, MODERATE (H): ICD-10-CM

## 2023-03-23 PROBLEM — J45.909 ASTHMA: Status: RESOLVED | Noted: 2022-01-02 | Resolved: 2023-03-23

## 2023-03-23 LAB
ALBUMIN SERPL BCG-MCNC: 4.7 G/DL (ref 3.5–5.2)
ALP SERPL-CCNC: 55 U/L (ref 35–104)
ALT SERPL W P-5'-P-CCNC: 12 U/L (ref 10–35)
ANION GAP SERPL CALCULATED.3IONS-SCNC: 16 MMOL/L (ref 7–15)
AST SERPL W P-5'-P-CCNC: 17 U/L (ref 10–35)
BILIRUB SERPL-MCNC: 0.4 MG/DL
BUN SERPL-MCNC: 11 MG/DL (ref 6–20)
C TRACH DNA SPEC QL NAA+PROBE: NEGATIVE
C TRACH DNA SPEC QL NAA+PROBE: NEGATIVE
CALCIUM SERPL-MCNC: 9.4 MG/DL (ref 8.6–10)
CHLORIDE SERPL-SCNC: 103 MMOL/L (ref 98–107)
CREAT SERPL-MCNC: 0.63 MG/DL (ref 0.51–0.95)
DEPRECATED HCO3 PLAS-SCNC: 20 MMOL/L (ref 22–29)
ERYTHROCYTE [DISTWIDTH] IN BLOOD BY AUTOMATED COUNT: 12 % (ref 10–15)
GFR SERPL CREATININE-BSD FRML MDRD: >90 ML/MIN/1.73M2
GLUCOSE SERPL-MCNC: 75 MG/DL (ref 70–99)
HCT VFR BLD AUTO: 40.1 % (ref 35–47)
HGB BLD-MCNC: 13.2 G/DL (ref 11.7–15.7)
HSV1 DNA SPEC QL NAA+PROBE: NOT DETECTED
HSV2 DNA SPEC QL NAA+PROBE: NOT DETECTED
MAGNESIUM SERPL-MCNC: 2 MG/DL (ref 1.7–2.3)
MCH RBC QN AUTO: 28.7 PG (ref 26.5–33)
MCHC RBC AUTO-ENTMCNC: 32.9 G/DL (ref 31.5–36.5)
MCV RBC AUTO: 87 FL (ref 78–100)
N GONORRHOEA DNA SPEC QL NAA+PROBE: NEGATIVE
N GONORRHOEA DNA SPEC QL NAA+PROBE: NEGATIVE
PLATELET # BLD AUTO: 256 10E3/UL (ref 150–450)
POTASSIUM SERPL-SCNC: 4 MMOL/L (ref 3.4–5.3)
PROT SERPL-MCNC: 7.1 G/DL (ref 6.4–8.3)
RBC # BLD AUTO: 4.6 10E6/UL (ref 3.8–5.2)
SODIUM SERPL-SCNC: 139 MMOL/L (ref 136–145)
TSH SERPL DL<=0.005 MIU/L-ACNC: 1.23 UIU/ML (ref 0.3–4.2)
WBC # BLD AUTO: 7.1 10E3/UL (ref 4–11)

## 2023-03-23 PROCEDURE — 90471 IMMUNIZATION ADMIN: CPT | Performed by: FAMILY MEDICINE

## 2023-03-23 PROCEDURE — 90651 9VHPV VACCINE 2/3 DOSE IM: CPT | Performed by: FAMILY MEDICINE

## 2023-03-23 PROCEDURE — 93000 ELECTROCARDIOGRAM COMPLETE: CPT | Performed by: FAMILY MEDICINE

## 2023-03-23 PROCEDURE — 36415 COLL VENOUS BLD VENIPUNCTURE: CPT | Performed by: FAMILY MEDICINE

## 2023-03-23 PROCEDURE — 85027 COMPLETE CBC AUTOMATED: CPT | Performed by: FAMILY MEDICINE

## 2023-03-23 PROCEDURE — 99215 OFFICE O/P EST HI 40 MIN: CPT | Mod: 25 | Performed by: FAMILY MEDICINE

## 2023-03-23 RX ORDER — ALPRAZOLAM 0.25 MG
0.25 TABLET ORAL 3 TIMES DAILY PRN
Qty: 14 TABLET | Refills: 0 | Status: SHIPPED | OUTPATIENT
Start: 2023-03-23 | End: 2023-04-14

## 2023-03-23 RX ORDER — ESCITALOPRAM OXALATE 10 MG/1
10 TABLET ORAL DAILY
Qty: 30 TABLET | Refills: 1 | Status: SHIPPED | OUTPATIENT
Start: 2023-03-23 | End: 2023-04-14

## 2023-03-23 ASSESSMENT — ANXIETY QUESTIONNAIRES
7. FEELING AFRAID AS IF SOMETHING AWFUL MIGHT HAPPEN: NEARLY EVERY DAY
6. BECOMING EASILY ANNOYED OR IRRITABLE: NEARLY EVERY DAY
3. WORRYING TOO MUCH ABOUT DIFFERENT THINGS: NEARLY EVERY DAY
2. NOT BEING ABLE TO STOP OR CONTROL WORRYING: NEARLY EVERY DAY
5. BEING SO RESTLESS THAT IT IS HARD TO SIT STILL: NEARLY EVERY DAY
GAD7 TOTAL SCORE: 21
1. FEELING NERVOUS, ANXIOUS, OR ON EDGE: NEARLY EVERY DAY
IF YOU CHECKED OFF ANY PROBLEMS ON THIS QUESTIONNAIRE, HOW DIFFICULT HAVE THESE PROBLEMS MADE IT FOR YOU TO DO YOUR WORK, TAKE CARE OF THINGS AT HOME, OR GET ALONG WITH OTHER PEOPLE: EXTREMELY DIFFICULT
GAD7 TOTAL SCORE: 21

## 2023-03-23 ASSESSMENT — ASTHMA QUESTIONNAIRES: ACT_TOTALSCORE: 11

## 2023-03-23 ASSESSMENT — PAIN SCALES - GENERAL: PAINLEVEL: NO PAIN (0)

## 2023-03-23 ASSESSMENT — PATIENT HEALTH QUESTIONNAIRE - PHQ9
5. POOR APPETITE OR OVEREATING: NEARLY EVERY DAY
SUM OF ALL RESPONSES TO PHQ QUESTIONS 1-9: 14

## 2023-03-23 NOTE — PROGRESS NOTES
Assessment & Plan       (R00.2) Palpitations  Comment:   Plan: EKG 12-lead complete w/read - Clinics,         Comprehensive metabolic panel, TSH with free T4        reflex, CBC with platelets, Magnesium,           Discussed possible differential diagnosis for her symptoms. Sounds liek anxiety related. Her EKG is ok. Check labs.         Talked about warning s/s for which should be seen. Discussed relaxation,moderate  exercise routine. Consider further evaluation if any worsening, recurrent sx  or concerning sx          She verbalizes understanding                (F43.23) Adjustment disorder with mixed anxiety and depressed mood  Comment:   Plan: Adult Mental Health  Referral,         : escitalopram (LEXAPRO) 10 MG         tablet            (F33.1) Major depressive disorder, recurrent episode, moderate (H)  Comment:   Plan: Adult Mental Health  Referral,          ALPRAZolam (XANAX) 0.25 MG tablet            Discussed cares, talked about signs and symptoms of anxiety/ depression and treatment options.   Willing to start medication again with low dose lexapro.   Also gave few xanax to take as needed. Pros/ cons of med's discussed .    Talked about her relationship/ std concerns and guided her to avoid taking risks, and taking care of her health and focusing on her own wellbeing etc     spent sometimes counseling patient.  encouraged to continue to see  to help. She has negative thoughts sometimes , but does not think she will hurt herself. Talked about crisis line and she is aware      Follow up in 3-4  weeks, sooner if problem.       (Z23) Need for HPV vaccine  (primary encounter diagnosis)  Comment:   Plan: HPV9 (GARDASIL 9)              Check labs. refill sent.Cares and  treatment discussed. follow up if problem   Patient expressed understanding and agreement with treatment plan. All patient's questions were answered, will let me know if has more later.  Medications: Rx's: Reviewed the  potential side effects/complications of medications prescribed.   Spent 40+  min with patient and more then 50% of the time spent counseling and coordination of cares       Nidia Kilgore MD  [facility] JENSEN Hernandes is a 22 year old, presenting for the following health issues:  Palpitations  No flowsheet data found.  Palpitations    History of Present Illness       Reason for visit:  I have an increased heart rate and I d like to check for any std/sti s because I engaged in unprotected oral/vaginal sex  Symptom onset:  1-2 weeks ago  Symptoms include:  Increased heart rate began 1-2 weeks ago and feels like its getting worse. short of breath, sores inside mouth began 5 days after unprotected oral sex, dull discomfort in lower regions  Symptom intensity:  Moderate  Symptom progression:  Worsening  Had these symptoms before:  No  What makes it worse:  My increased heart rate is the worst. Sends me in a spiral making me think I m going to die. Its the worst in the morning and at night  What makes it better:  Nothing    She eats 2-3 servings of fruits and vegetables daily.She consumes 1 sweetened beverage(s) daily.She exercises with enough effort to increase her heart rate 9 or less minutes per day.  She exercises with enough effort to increase her heart rate 3 or less days per week.   She is taking medications regularly.      having Non  exertional chest pain ,sob , palpitation lately    It can happen  any time and sometimes at night while resting .  admits that she is feeling stressed has stopped taking her med's more then a year ago ,and she thought she was ok for a while and now  sx have sx coming back    lately having these heart palpitation often just sitting resting etc. So just wanted to get checked . Unsure if it is panic attack or not.  dad had issue with anxiety as well and on med's     Depression and Anxiety Follow-Up    How are you doing with your depression since  your last visit? Worsened olvin stopped all med's bc she does not want get dependent on it , but sx are coming back     How are you doing with your anxiety since your last visit?  Worsened     Are you having other symptoms that might be associated with depression or anxiety? Yes:  as above     Have you had a significant life event? Relationship Concerns Can be  Adding stress . Had unprotected sex with a New male partner,  on 3/12 , with unprotected vaginal and oral sex. so she was also worried about std, but had seen a provider yesterday and labs were done, awaiting results     Do you have any concerns with your use of alcohol or other drugs? No    Social History     Tobacco Use     Smoking status: Former     Types: Vaping Device     Quit date: 3/21/2023     Smokeless tobacco: Never   Vaping Use     Vaping Use: Former     Devices: Disposable   Substance Use Topics     Alcohol use: No     Alcohol/week: 0.0 standard drinks     Drug use: Not Currently     Types: Marijuana     PHQ 7/13/2021 12/30/2021 1/24/2023   PHQ-9 Total Score 10 4 16   Q9: Thoughts of better off dead/self-harm past 2 weeks Not at all Not at all Not at all     VIOLA-7 SCORE 4/28/2021 7/13/2021 3/21/2023   Total Score - - -   Total Score - 3 (minimal anxiety) 21 (severe anxiety)   Total Score 5 3 -       Suicide Assessment Five-step Evaluation and Treatment (SAFE-T)          Review of Systems   Constitutional, HEENT, cardiovascular, pulmonary, GI, , musculoskeletal, neuro, skin, endocrine and psych systems are negative, except as otherwise noted.      Objective    BP 94/50   Pulse 60   Temp 98.7  F (37.1  C) (Tympanic)   Resp 16   Wt 39.5 kg (87 lb)   LMP 03/04/2023 (Exact Date)   SpO2 99%   BMI 17.42 kg/m    Body mass index is 17.42 kg/m .  Physical Exam   GENERAL: healthy, alert and no distress  EYES: Eyes grossly normal to inspection, PERRL and conjunctivae and sclerae normal  HENT:mouth without ulcers or lesions  NECK: no adenopathy, no  asymmetry, masses, or scars and thyroid normal to palpation  RESP: lungs clear to auscultation - no rales, rhonchi or wheezes  CV: regular rate and rhythm, normal S1 S2, no S3 or S4, no murmur,   ABDOMEN: soft, nontender, no hepatosplenomegaly, no masses and bowel sounds normal  MS: no edema  SKIN: no suspicious lesions or rashes  NEURO: Normal strength and tone, mentation intact and speech normal  PSYCH: mentation appears normal, affect normal, slightly  anxious, fatigued, speech pressured, judgement and insight intact and appearance well groomed

## 2023-03-23 NOTE — RESULT ENCOUNTER NOTE
Dear Cecilio,      Your recent test results are noted below:    -All of your STD labs are normal. Herpes test was negative as well. Please note that sometimes test is falsely negative from inadequate sample. As we discussed in clinic, herpes can recur. So if you get a recurrence in future I'd recommend repeat exam at that time for consideration of testing again. You also are not immune to hepatitis B despite receiving this series in childhood. We can repeat this series again for ongoing protection against hep B. Let me know if you wish to pursue this.     For additional lab test information, labtestsonline.org is an excellent reference. Please contact the clinic at [phone] with any further questions or concerns.    Sincerely,      FAWAD Martin [facility]

## 2023-03-25 ENCOUNTER — APPOINTMENT (OUTPATIENT)
Dept: GENERAL RADIOLOGY | Facility: CLINIC | Age: 23
End: 2023-03-25
Attending: EMERGENCY MEDICINE
Payer: COMMERCIAL

## 2023-03-25 ENCOUNTER — HOSPITAL ENCOUNTER (EMERGENCY)
Facility: CLINIC | Age: 23
Discharge: HOME | End: 2023-03-25
Attending: EMERGENCY MEDICINE | Admitting: EMERGENCY MEDICINE
Payer: COMMERCIAL

## 2023-03-25 VITALS
SYSTOLIC BLOOD PRESSURE: 121 MMHG | HEART RATE: 101 BPM | OXYGEN SATURATION: 97 % | DIASTOLIC BLOOD PRESSURE: 66 MMHG | BODY MASS INDEX: 17.54 KG/M2 | WEIGHT: 87 LBS | TEMPERATURE: 99.8 F | HEIGHT: 59 IN | RESPIRATION RATE: 14 BRPM

## 2023-03-25 DIAGNOSIS — R07.9 CHEST PAIN, UNSPECIFIED TYPE: ICD-10-CM

## 2023-03-25 DIAGNOSIS — R00.2 PALPITATIONS: ICD-10-CM

## 2023-03-25 LAB
ANION GAP SERPL CALCULATED.3IONS-SCNC: 9 MMOL/L (ref 7–15)
ATRIAL RATE - MUSE: 90 BPM
BASOPHILS # BLD AUTO: 0.1 10E3/UL (ref 0–0.2)
BASOPHILS NFR BLD AUTO: 1 %
BKR LAB AP GYN ADEQUACY: NORMAL
BKR LAB AP GYN INTERPRETATION: NORMAL
BKR LAB AP HPV REFLEX: NO
BKR LAB AP LMP: NORMAL
BKR LAB AP PREVIOUS ABNORMAL: NORMAL
BUN SERPL-MCNC: 15.3 MG/DL (ref 6–20)
CALCIUM SERPL-MCNC: 9.6 MG/DL (ref 8.6–10)
CHLORIDE SERPL-SCNC: 104 MMOL/L (ref 98–107)
CREAT SERPL-MCNC: 0.67 MG/DL (ref 0.51–0.95)
D DIMER PPP FEU-MCNC: <0.27 UG/ML FEU (ref 0–0.5)
DEPRECATED HCO3 PLAS-SCNC: 24 MMOL/L (ref 22–29)
DIASTOLIC BLOOD PRESSURE - MUSE: NORMAL MMHG
EOSINOPHIL # BLD AUTO: 0 10E3/UL (ref 0–0.7)
EOSINOPHIL NFR BLD AUTO: 0 %
ERYTHROCYTE [DISTWIDTH] IN BLOOD BY AUTOMATED COUNT: 11.4 % (ref 10–15)
GFR SERPL CREATININE-BSD FRML MDRD: >90 ML/MIN/1.73M2
GLUCOSE SERPL-MCNC: 91 MG/DL (ref 70–99)
HCT VFR BLD AUTO: 37.9 % (ref 35–47)
HGB BLD-MCNC: 12.8 G/DL (ref 11.7–15.7)
IMM GRANULOCYTES # BLD: 0 10E3/UL
IMM GRANULOCYTES NFR BLD: 0 %
INTERPRETATION ECG - MUSE: NORMAL
LYMPHOCYTES # BLD AUTO: 1.8 10E3/UL (ref 0.8–5.3)
LYMPHOCYTES NFR BLD AUTO: 19 %
MCH RBC QN AUTO: 28.9 PG (ref 26.5–33)
MCHC RBC AUTO-ENTMCNC: 33.8 G/DL (ref 31.5–36.5)
MCV RBC AUTO: 86 FL (ref 78–100)
MONOCYTES # BLD AUTO: 0.4 10E3/UL (ref 0–1.3)
MONOCYTES NFR BLD AUTO: 5 %
NEUTROPHILS # BLD AUTO: 7.4 10E3/UL (ref 1.6–8.3)
NEUTROPHILS NFR BLD AUTO: 75 %
NRBC # BLD AUTO: 0 10E3/UL
NRBC BLD AUTO-RTO: 0 /100
P AXIS - MUSE: 55 DEGREES
PATH REPORT.COMMENTS IMP SPEC: NORMAL
PATH REPORT.COMMENTS IMP SPEC: NORMAL
PATH REPORT.RELEVANT HX SPEC: NORMAL
PLATELET # BLD AUTO: 266 10E3/UL (ref 150–450)
POTASSIUM SERPL-SCNC: 4 MMOL/L (ref 3.4–5.3)
PR INTERVAL - MUSE: 128 MS
QRS DURATION - MUSE: 76 MS
QT - MUSE: 374 MS
QTC - MUSE: 457 MS
R AXIS - MUSE: 67 DEGREES
RBC # BLD AUTO: 4.43 10E6/UL (ref 3.8–5.2)
SODIUM SERPL-SCNC: 137 MMOL/L (ref 136–145)
SYSTOLIC BLOOD PRESSURE - MUSE: NORMAL MMHG
T AXIS - MUSE: 40 DEGREES
TROPONIN T SERPL HS-MCNC: <6 NG/L
VENTRICULAR RATE- MUSE: 90 BPM
WBC # BLD AUTO: 9.8 10E3/UL (ref 4–11)

## 2023-03-25 PROCEDURE — 93005 ELECTROCARDIOGRAM TRACING: CPT

## 2023-03-25 PROCEDURE — 36415 COLL VENOUS BLD VENIPUNCTURE: CPT | Performed by: EMERGENCY MEDICINE

## 2023-03-25 PROCEDURE — 84484 ASSAY OF TROPONIN QUANT: CPT | Performed by: EMERGENCY MEDICINE

## 2023-03-25 PROCEDURE — 80048 BASIC METABOLIC PNL TOTAL CA: CPT | Performed by: EMERGENCY MEDICINE

## 2023-03-25 PROCEDURE — 85025 COMPLETE CBC W/AUTO DIFF WBC: CPT | Performed by: EMERGENCY MEDICINE

## 2023-03-25 PROCEDURE — 85379 FIBRIN DEGRADATION QUANT: CPT | Performed by: EMERGENCY MEDICINE

## 2023-03-25 PROCEDURE — 71046 X-RAY EXAM CHEST 2 VIEWS: CPT

## 2023-03-25 PROCEDURE — 99285 EMERGENCY DEPT VISIT HI MDM: CPT | Mod: 25

## 2023-03-25 ASSESSMENT — ENCOUNTER SYMPTOMS
DYSURIA: 0
NUMBNESS: 0
PALPITATIONS: 1
DIFFICULTY URINATING: 0
DIARRHEA: 0
FREQUENCY: 0
BACK PAIN: 1
HEMATURIA: 0
FEVER: 0
ABDOMINAL PAIN: 0
WEAKNESS: 0
VOMITING: 0
COUGH: 0

## 2023-03-25 ASSESSMENT — ACTIVITIES OF DAILY LIVING (ADL): ADLS_ACUITY_SCORE: 33

## 2023-03-26 NOTE — ED TRIAGE NOTES
"Pt presents to triage with complaint of feeling like she has fast heart rate.  Pt states that this started last week, tonight she came in because of chest tightness and a \"stinging\" feeling in her chest.  Pt expresses concern because her doctors haven't discussed previous results with her about this issue.      "

## 2023-03-26 NOTE — ED PROVIDER NOTES
"  History     Chief Complaint:  Palpitations       HPI   Ayana Hollins is a 22 year old female who presents with palpitations. The patient reports that she has felt her heart racing for the last 2 weeks as well as a constant, \"stinging\" sensation in her upper chest when she breathes. Additionally, Ayana complains of lower back pain for the past few months across the entire low back. Denies urinary symptoms, numbness and weakness in the legs, perineal numbness, abdominal pain, fever, cough, vomiting, diarrhea, and leg swelling. Patient denies immobilization for >3 days or surgery within the previous 4 weeks, history of DVT or PE, hemoptysis, malignancy with treatment within previous 6 months or palliative therapy, or exogenous estrogen use.       Independent Historian:   None - Patient Only    Review of External Notes: N/A     ROS:  Review of Systems   Constitutional: Negative for fever.   Respiratory: Negative for cough.    Cardiovascular: Positive for palpitations. Negative for leg swelling.   Gastrointestinal: Negative for abdominal pain, diarrhea and vomiting.   Genitourinary: Negative for decreased urine volume, difficulty urinating, dysuria, frequency, hematuria and urgency.   Musculoskeletal: Positive for back pain.   Neurological: Negative for weakness and numbness.   All other systems reviewed and are negative.      Allergies:  No Known Allergies     Medications:    Xanax  Lexapro    Past Medical History:    Depression  Suicidal ideation  Self harm  Menarche  Left breast lump    Family History:    Father: Anxiety Disorder    Social History:  Patient reports that she quit smoking 4 days ago. Her smoking use included vaping device. She has never used smokeless tobacco. She reports that she does not currently use drugs after having used the following drugs: Marijuana. She reports that she does not drink alcohol.  Patient arrives by private vehicle alone  PCP: Nidia Kilgore     Physical Exam " "    Patient Vitals for the past 24 hrs:   BP Temp Temp src Pulse Resp SpO2 Height Weight   03/25/23 2036 121/66 99.8  F (37.7  C) Temporal 101 14 97 % 1.499 m (4' 11\") 39.5 kg (87 lb)        Physical Exam  VS: Reviewed per above  HENT: normal speech  EYES: sclera anicteric  CV: Rate as noted, regular rhythm.   RESP: Effort normal. Breath sounds are normal bilaterally.  NEURO: Alert, moving all extremities  MSK: No deformity of the extremities. NO CVAT or reproducible low back ttp.  SKIN: Warm and dry    Emergency Department Course   ECG  ECG taken at 2035, ECG read at 2115  Sinus rhythm with marked sinus arrhythmia  Nonspecific T wave abnormality  Abnormal ECG   Rate 90 bpm. NH interval 128 ms. QRS duration 76 ms. QT/QTc 374/457 ms. P-R-T axes 55 67 40.       Imaging:  XR Chest 2 Views   Final Result   IMPRESSION: Negative chest.         Report per radiology    Laboratory:  Labs Ordered and Resulted from Time of ED Arrival to Time of ED Departure   BASIC METABOLIC PANEL - Normal       Result Value    Sodium 137      Potassium 4.0      Chloride 104      Carbon Dioxide (CO2) 24      Anion Gap 9      Urea Nitrogen 15.3      Creatinine 0.67      Calcium 9.6      Glucose 91      GFR Estimate >90     TROPONIN T, HIGH SENSITIVITY - Normal    Troponin T, High Sensitivity <6     D DIMER QUANTITATIVE - Normal    D-Dimer Quantitative <0.27     CBC WITH PLATELETS AND DIFFERENTIAL    WBC Count 9.8      RBC Count 4.43      Hemoglobin 12.8      Hematocrit 37.9      MCV 86      MCH 28.9      MCHC 33.8      RDW 11.4      Platelet Count 266      % Neutrophils 75      % Lymphocytes 19      % Monocytes 5      % Eosinophils 0      % Basophils 1      % Immature Granulocytes 0      NRBCs per 100 WBC 0      Absolute Neutrophils 7.4      Absolute Lymphocytes 1.8      Absolute Monocytes 0.4      Absolute Eosinophils 0.0      Absolute Basophils 0.1      Absolute Immature Granulocytes 0.0      Absolute NRBCs 0.0            Emergency Department " Course & Assessments:    Interventions:  Medications - No data to display     Assessments:  2106 I obtained history and examined the patient as noted above  2149 I reexamined the patient and explained findings    Independent Interpretation (X-rays, CTs, rhythm strip):  None    Consultations/Discussion of Management or Tests:  None        Social Determinants of Health affecting care:   None    Disposition:  The patient was discharged to home.     Impression & Plan      Medical Decision Making:  Patient presents to the ER for evaluation of palpitations, seeing sensation in her upper chest with breathing.  On arrival she has heart rate of 101.  Other vital signs are reassuring.  Exam is unremarkable.  ECG is sinus rhythm with sinus arrhythmia.  As she is having symptoms during ECG acquisition, suspect she may be feeling the irregularity of sinus arrhythmia.  Recent thyroid function testing and electrolytes in the outpatient realm a few days ago were reassuring.  Today no signs of myocardial ischemia or DVT/PE.  Chest x-ray is clear.  No emergent etiology for symptoms identified.  Discussed lifestyle modification including stress modification, decreasing caffeine intake, getting adequate sleep, etc.  Recommended ongoing primary care follow-up and return precautions discussed prior to discharge.    Diagnosis:    ICD-10-CM    1. Palpitations  R00.2       2. Chest pain, unspecified type  R07.9              Scribe Disclosure:  I, Allen Sylvester, am serving as a scribe at 9:05 PM on 3/25/2023 to document services personally performed by Javon Bustamante MD based on my observations and the provider's statements to me.   3/25/2023   Javon Bustamante MD Lindenbaum, Elan, MD  03/25/23 3520

## 2023-03-27 NOTE — TELEPHONE ENCOUNTER
My chart message sent     Meli Bauer RN, BSN  [facility]

## 2023-03-31 ENCOUNTER — APPOINTMENT (OUTPATIENT)
Dept: FAMILY MEDICINE | Facility: CLINIC | Age: 23
End: 2023-03-31
Payer: COMMERCIAL

## 2023-04-06 ENCOUNTER — E-VISIT (OUTPATIENT)
Dept: FAMILY MEDICINE | Facility: CLINIC | Age: 23
End: 2023-04-06
Payer: COMMERCIAL

## 2023-04-06 DIAGNOSIS — R10.84 ABDOMINAL PAIN, GENERALIZED: ICD-10-CM

## 2023-04-06 DIAGNOSIS — K59.00 CONSTIPATION, UNSPECIFIED CONSTIPATION TYPE: ICD-10-CM

## 2023-04-06 PROCEDURE — 99207 PR NO CHARGE NURSE ONLY: CPT | Performed by: FAMILY MEDICINE

## 2023-04-10 NOTE — TELEPHONE ENCOUNTER
Provider E-Visit time total (minutes):     Jatin Piña, this concern really will need a face to face visit to adequately and safely come up with a diagnosis and appropriate plan. If your symptoms are severe(  and you said your pain is at scale of 10 ) , then you ened to be seen, so please go to urgent care or ER based on on how you are feeling       If you have any questions or concerns please feel free to contact my office , [phone]   Nidia Kilgore MD

## 2023-04-13 ENCOUNTER — NURSE TRIAGE (OUTPATIENT)
Dept: FAMILY MEDICINE | Facility: CLINIC | Age: 23
End: 2023-04-13

## 2023-04-13 ENCOUNTER — OFFICE VISIT (OUTPATIENT)
Dept: MIDWIFE SERVICES | Facility: CLINIC | Age: 23
End: 2023-04-13
Payer: COMMERCIAL

## 2023-04-13 VITALS
SYSTOLIC BLOOD PRESSURE: 112 MMHG | BODY MASS INDEX: 18.14 KG/M2 | HEIGHT: 59 IN | DIASTOLIC BLOOD PRESSURE: 68 MMHG | WEIGHT: 90 LBS

## 2023-04-13 DIAGNOSIS — R10.31 RIGHT LOWER QUADRANT ABDOMINAL PAIN: ICD-10-CM

## 2023-04-13 DIAGNOSIS — K59.00 CONSTIPATION, UNSPECIFIED CONSTIPATION TYPE: Primary | ICD-10-CM

## 2023-04-13 PROCEDURE — 99203 OFFICE O/P NEW LOW 30 MIN: CPT | Performed by: ADVANCED PRACTICE MIDWIFE

## 2023-04-13 RX ORDER — DOCUSATE SODIUM 100 MG/1
100 CAPSULE, LIQUID FILLED ORAL 2 TIMES DAILY PRN
Qty: 180 CAPSULE | Refills: 1 | Status: SHIPPED | OUTPATIENT
Start: 2023-04-13 | End: 2023-04-14

## 2023-04-13 ASSESSMENT — ANXIETY QUESTIONNAIRES
1. FEELING NERVOUS, ANXIOUS, OR ON EDGE: NEARLY EVERY DAY
3. WORRYING TOO MUCH ABOUT DIFFERENT THINGS: NEARLY EVERY DAY
GAD7 TOTAL SCORE: 21
GAD7 TOTAL SCORE: 21
6. BECOMING EASILY ANNOYED OR IRRITABLE: NEARLY EVERY DAY
7. FEELING AFRAID AS IF SOMETHING AWFUL MIGHT HAPPEN: NEARLY EVERY DAY
5. BEING SO RESTLESS THAT IT IS HARD TO SIT STILL: NEARLY EVERY DAY
IF YOU CHECKED OFF ANY PROBLEMS ON THIS QUESTIONNAIRE, HOW DIFFICULT HAVE THESE PROBLEMS MADE IT FOR YOU TO DO YOUR WORK, TAKE CARE OF THINGS AT HOME, OR GET ALONG WITH OTHER PEOPLE: SOMEWHAT DIFFICULT
2. NOT BEING ABLE TO STOP OR CONTROL WORRYING: NEARLY EVERY DAY

## 2023-04-13 ASSESSMENT — PATIENT HEALTH QUESTIONNAIRE - PHQ9
5. POOR APPETITE OR OVEREATING: NEARLY EVERY DAY
SUM OF ALL RESPONSES TO PHQ QUESTIONS 1-9: 17

## 2023-04-13 NOTE — TELEPHONE ENCOUNTER
Pt complains of moderate intermietent abdominal pain for 2.5 wks. Pain is on left side of abdomen and radiates to her pelvic area. Pt denies fever, vomiting or chest pain. Triage advised she needs to be seen today or tomorrow. Pt agreed to schedule OV today MOA.     Reason for Disposition    Pain is mainly in upper abdomen (if needed ask: 'is it mainly above the belly button?')    MILD TO MODERATE pain that comes and goes (cramps) lasts > 24 hours    Additional Information    Negative: Passed out (i.e., fainted, collapsed and was not responding)    Negative: Shock suspected (e.g., cold/pale/clammy skin, too weak to stand, low BP, rapid pulse)    Negative: Sounds like a life-threatening emergency to the triager    Negative: Chest pain    Negative: Passed out (i.e., fainted, collapsed and was not responding)    Negative: Shock suspected (e.g., cold/pale/clammy skin, too weak to stand, low BP, rapid pulse)    Negative: Visible sweat on face or sweat is dripping down    Negative: Chest pain    Negative: SEVERE abdominal pain (e.g., excruciating)    Negative: Pain lasting > 10 minutes and over 50 years old    Negative: Pain lasting > 10 minutes and over 40 years old and associated chest, arm, neck, upper back, or jaw pain    Negative: Pain lasting > 10 minutes and over 35 years old and at least one cardiac risk factor (i.e., hypertension, diabetes, obesity, smoker or strong family history of heart disease)    Negative: Pain lasting > 10 minutes and history of heart disease (i.e., heart attack, bypass surgery, angina, angioplasty, CHF)    Negative: Recent injury to the abdomen    Negative: Vomiting red blood or black (coffee ground) material    Negative: Bloody, black, or tarry bowel movements  (Exception: Chronic-unchanged black-grey bowel movements and is taking iron pills or Pepto-Bismol.)    Negative: Pregnant 20 weeks or more and new hand or face swelling    Negative: Constant abdominal pain lasting > 2 hours     Negative: Vomiting bile (green color)    Negative: Patient sounds very sick or weak to the triager    Negative: Vomiting and abdomen looks much more swollen than usual    Negative: White of the eyes have turned yellow (i.e., jaundice)    Negative: Fever > 103 F (39.4 C)    Negative: Fever > 101 F (38.3 C) and over 60 years of age    Negative: Fever > 100.0 F (37.8 C) and has diabetes mellitus or a weak immune system (e.g., HIV positive, cancer chemotherapy, organ transplant, splenectomy, chronic steroids)    Negative: Fever > 100.0 F (37.8 C) and bedridden (e.g., nursing home patient, stroke, chronic illness, recovering from surgery)    Negative: Age > 60 years    Negative: Patient wants to be seen    Protocols used: ABDOMINAL PAIN - FEMALE-A-OH, ABDOMINAL PAIN - UPPER-A-OH

## 2023-04-13 NOTE — PROGRESS NOTES
SUBJECTIVE:                                                   Ayana Hollins is a 22 year old female who presents to clinic today for the following health issue(s):  Patient presents with:  Consult: Establish care      HPI:  Cecilio is here today because her primary care doctor wanted her to follow up with us.     She was seen at primary care on 3/22/23. GC/CT & pap were completed then and were WNL.     She has been experiencing mid-UQ pain and RLQ pain. No aggravating or alleviating factors. She does have intermittent constipation. States she goes every day, but very small amount. Then after a few days she finally goes a normal amount. She denies fever, chills, dysuria, dyspareunia, abnormal vaginal discharge.     Patient's last menstrual period was 2023 (exact date)..     Patient is sexually active, .  Using none and condoms for contraception.    reports that she quit smoking about 3 weeks ago. Her smoking use included vaping device. She has never used smokeless tobacco.    STD testing offered?  Declined    Health maintenance updated:  yes    Today's PHQ-2 Score:       2021     5:33 PM   PHQ-2 (  [employer])   Q1: Little interest or pleasure in doing things 1   Q2: Feeling down, depressed or hopeless 1   PHQ-2 Score 2   PHQ-2 Total Score (12-17 Years)- Positive if 3 or more points; Administer PHQ-A if positive 2   Q1: Little interest or pleasure in doing things Several days   Q2: Feeling down, depressed or hopeless Several days   PHQ-2 Score 2     Today's PHQ-9 Score:       2023     3:23 PM   PHQ-9 SCORE   PHQ-9 Total Score 17     Today's VIOLA-7 Score:       2023     3:23 PM   VIOLA-7 SCORE   Total Score 21       Problem list and histories reviewed & adjusted, as indicated.  Additional history: as documented.    Patient Active Problem List   Diagnosis     Menarche     Nausea without vomiting     Self-inflicted injury     Suicidal ideation     Major depressive disorder, recurrent episode,  "moderate (H)     Left breast lump     Past Surgical History:   Procedure Laterality Date     NO HISTORY OF SURGERY        Social History     Tobacco Use     Smoking status: Former     Types: Vaping Device     Quit date: 3/21/2023     Years since quittin.0     Smokeless tobacco: Never   Vaping Use     Vaping status: Former     Devices: Disposable   Substance Use Topics     Alcohol use: No     Alcohol/week: 0.0 standard drinks of alcohol      Problem (# of Occurrences) Relation (Name,Age of Onset)    Anxiety Disorder (1) Father: her biological dad     Unknown/Adopted (2) Paternal Grandmother, Paternal Grandfather    Hypertension (1) Maternal Grandmother    No Known Problems (5) Mother, Sister, Brother, Maternal Grandfather, Other            Current Outpatient Medications   Medication Sig     docusate sodium (COLACE) 100 MG capsule Take 1 capsule (100 mg) by mouth 2 times daily as needed for constipation     ALPRAZolam (XANAX) 0.25 MG tablet Take 1 tablet (0.25 mg) by mouth 3 times daily as needed for anxiety (Patient not taking: Reported on 2023)     escitalopram (LEXAPRO) 10 MG tablet Take 1 tablet (10 mg) by mouth daily (Patient not taking: Reported on 2023)     No current facility-administered medications for this visit.     No Known Allergies    ROS:  12 point review of systems negative other than symptoms noted below or in the HPI.  Gastrointestinal: Abdominal Pain on lower right area crease above the leg and mid upper quadrant pain.       OBJECTIVE:     /68   Ht 1.499 m (4' 11\")   Wt 40.8 kg (90 lb)   LMP 2023 (Exact Date)   BMI 18.18 kg/m    Body mass index is 18.18 kg/m .    Exam:  Constitutional:  Appearance: Well nourished, well developed alert, in no acute distress  Gastrointestinal:  Abdominal Examination:  Abdomen nontender to palpation, tone normal without rigidity or guarding, no masses present, umbilicus without lesions; Liver/Spleen:  No hepatomegaly present, liver " nontender to palpation; Hernias:  No hernias present. No discomfort on palpation.  Neurologic:  Mental Status:  Oriented X3.  Normal strength and tone, sensory exam grossly normal, mentation intact and speech normal.    Psychiatric:  Mentation appears normal and affect normal/bright.     In-Clinic Test Results:  No results found for this or any previous visit (from the past 24 hour(s)).    ASSESSMENT/PLAN:                                                        ICD-10-CM    1. Constipation, unspecified constipation type  K59.00 docusate sodium (COLACE) 100 MG capsule      2. Right lower quadrant abdominal pain  R10.31 US Pelvic Complete with Transvaginal          There are no Patient Instructions on file for this visit.    Discussed that since the pain is located in mid-upper quadrant area and lower abdominal area it is probably GI related. Discussed high fiber diet and water intake. Plan to use colace BID PRN. If pain continues over the next month in both locations, then she should follow up with primary care. If pain continues only in RLQ pain then she should come back to us for a pelvic ultrasound (ordered)    30 minutes spent by me on the date of the encounter doing chart review, history and exam, documentation and further activities per the note      ZEINA Batista CNM  [facility] [place]

## 2023-04-13 NOTE — TELEPHONE ENCOUNTER
Patient able to walk upright. Rates at 5/10. Comes and goes. Denies vomiting. Pain the last 2.5 weeks. Intermittent diarrhea. Has had loose stool 5 times in the last 2.5 weeks.   Very tender lower left and right side of back stings. Mainly the lower left. No pain with urination.  No fever.    Patient states that appointment was cancelled today at [facility] due to was [facility]. As triaged earlier, patient needs appointment today or tomorrow.  Scheduled at [place] with Akanksha Lugo RN

## 2023-04-14 ENCOUNTER — OFFICE VISIT (OUTPATIENT)
Dept: FAMILY MEDICINE | Facility: CLINIC | Age: 23
End: 2023-04-14
Payer: COMMERCIAL

## 2023-04-14 VITALS
HEART RATE: 71 BPM | BODY MASS INDEX: 17.94 KG/M2 | OXYGEN SATURATION: 100 % | RESPIRATION RATE: 14 BRPM | SYSTOLIC BLOOD PRESSURE: 90 MMHG | TEMPERATURE: 98.1 F | HEIGHT: 59 IN | DIASTOLIC BLOOD PRESSURE: 59 MMHG | WEIGHT: 89 LBS

## 2023-04-14 DIAGNOSIS — K59.00 CONSTIPATION, UNSPECIFIED CONSTIPATION TYPE: Primary | ICD-10-CM

## 2023-04-14 DIAGNOSIS — R10.32 ABDOMINAL PAIN, LEFT LOWER QUADRANT: ICD-10-CM

## 2023-04-14 LAB — HCG UR QL: NEGATIVE

## 2023-04-14 PROCEDURE — 99213 OFFICE O/P EST LOW 20 MIN: CPT | Performed by: PHYSICIAN ASSISTANT

## 2023-04-14 PROCEDURE — 81025 URINE PREGNANCY TEST: CPT | Performed by: PHYSICIAN ASSISTANT

## 2023-04-14 ASSESSMENT — PAIN SCALES - GENERAL: PAINLEVEL: MILD PAIN (2)

## 2023-04-14 NOTE — PATIENT INSTRUCTIONS
Start metamucil: take twice daily until your are stooling regularly    Urine pregnancy check today     Continue to get plenty of water     Consider cutting down on gluten    GI referral if symptoms don't resolve with above

## 2023-04-14 NOTE — PROGRESS NOTES
Assessment and Plan:     (K59.00) Constipation, unspecified constipation type  (primary encounter diagnosis)  Comment:  Constipation x 2 weeks, recently increased gluten in diet but o/w no other changes, using metamucil once daily, reports a lot of fiber in diet, no vomiting, abdominal exam reassuring   Plan: cont to push fluids, increase metamucil to twice daily  Consider cutting down on gluten  Follow-up in about two weeks, if not better could consider GI referral    (R10.32) Abdominal pain, left lower quadrant  Comment: associated with constipation, no fever/chills, no nausea/vomiting, abdomen benign, saw midwife yesterday and pelvic US ordered but not scheduled yet   Plan: HCG Qual, Urine ([id number])  See above plan  Discussed when to be seen promptly       Akanksha Argueta PA-C        Subjective   Cecilio is a 22 year old, presenting for the following health issues:  Abdominal Pain         View : No data to display.              History of Present Illness       Reason for visit:  Abdominal pain  Symptom onset:  1-2 weeks ago  Symptoms include:  Left abdominal pain, lower swelling/hardness in lower abdomen  Symptom intensity:  Mild  Symptom progression:  Worsening  Had these symptoms before:  No  What makes it worse:  Nothing  What makes it better:  Nothing    She eats 4 or more servings of fruits and vegetables daily.She consumes 1 sweetened beverage(s) daily.She exercises with enough effort to increase her heart rate 9 or less minutes per day.  She exercises with enough effort to increase her heart rate 3 or less days per week.   She is taking medications regularly.     Cecilio is here for abdominal pain  It is mostly left-sided and dull/achy in nature   It started about two weeks ago  She was constipated for three days which was very painful  She feels like she is still constipated, she has been having stools intermittently but feels like they are small and loose   She denies nausea/vomiting, fever/chills,  "bloody/black stool, change of pregnancy, abnormal vaginal bleeding, dysuria or frequency  She hasn't had issues with constipation in the past     She recently added gluten back into her diet after cutting it out for about a year    She has >100 ounces of water per day  She also notes that she eats a lot of beans, lentils, brown rice, veggies      Review of Systems   See above      Objective    LMP 03/31/2023 (Exact Date)      BP 90/59 (BP Location: Right arm, Patient Position: Sitting, Cuff Size: Adult Small)   Pulse 71   Temp 98.1  F (36.7  C) (Oral)   Resp 14   Ht 1.499 m (4' 11.02\")   Wt 40.4 kg (89 lb)   LMP 03/31/2023 (Exact Date)   SpO2 100%   BMI 17.97 kg/m        Physical Exam     GENERAL: healthy, this,  alert and no distress  RESP: lungs clear to auscultation - no rales, no rhonchi, no wheezes  CV: regular rates and rhythm, normal S1 S2, no S3 or S4 and no murmur, no click or rub  ABD: soft, tender LLQ, no guarding, no rebound tenderness, +BS   MS: extremities- no gross deformities noted, no edema      "

## 2023-04-19 ENCOUNTER — NURSE TRIAGE (OUTPATIENT)
Dept: FAMILY MEDICINE | Facility: CLINIC | Age: 23
End: 2023-04-19
Payer: COMMERCIAL

## 2023-04-19 NOTE — TELEPHONE ENCOUNTER
"Nurse Triage SBAR    Is this a 2nd Level Triage? YES, LICENSED PRACTITIONER REVIEW IS REQUIRED    Situation: Pt feels constipated. LBM was small loose stool 3 days ago    Background: Pt was seen 4/14/23- advised to take metamucil and dulcolax chews. Constipated for 2 weeks. Has increased water and fiber, recently stared eating gluten again. Pain has gotten worse 5/10. Reports abdomen feels a bit firm.  Hcg negative    Assessment: pain is directly below her belly button, no nausea or vomiting,     Protocol Recommended Disposition:   See in Office Today or Tomorrow. Pt has been seen twice for this in the last 6 days    Recommendation:Advised pt to try a suppository and/or enema    Per visit on 4/14/23 consider a GI consult- referral pended    Routed to provider     Thank you  Usha Olivo RN on 4/19/2023 at 3:47 PM    Does the patient meet one of the following criteria for ADS visit consideration? 16+ years old, with an [facility] PCP     TIP  Providers, please consider if this condition is appropriate for management at one of our Acute and Diagnostic Services sites.     If patient is a good candidate, please use dotphrase <dot>triageresponse and select Refer to ADS to document.    Reason for Disposition    MILD pain (e.g., does not interfere with normal activities) and pain comes and goes (cramps) lasts > 48 hours  (Exception: This same abdominal pain is a chronic symptom recurrent or ongoing AND present > 4 weeks.)    Answer Assessment - Initial Assessment Questions  1. LOCATION: \"Where does it hurt?\"       Below belly button in the middle, swollen and firm  2. RADIATION: \"Does the pain shoot anywhere else?\" (e.g., chest, back)      Sits right there  3. ONSET: \"When did the pain begin?\" (e.g., minutes, hours or days ago)       About a month ago  4. SUDDEN: \"Gradual or sudden onset?\"      sudden  5. PATTERN \"Does the pain come and go, or is it constant?\"     - If constant: \"Is it getting better, staying the same, " "or worsening?\"       (Note: Constant means the pain never goes away completely; most serious pain is constant and it progresses)      - If intermittent: \"How long does it last?\" \"Do you have pain now?\"      (Note: Intermittent means the pain goes away completely between bouts)      All the times  6. SEVERITY: \"How bad is the pain?\"  (e.g., Scale 1-10; mild, moderate, or severe)    - MILD (1-3): doesn't interfere with normal activities, abdomen soft and not tender to touch     - MODERATE (4-7): interferes with normal activities or awakens from sleep, abdomen tender to touch     - SEVERE (8-10): excruciating pain, doubled over, unable to do any normal activities       5/10 crampy feels tight  7. RECURRENT SYMPTOM: \"Have you ever had this type of stomach pain before?\" If Yes, ask: \"When was the last time?\" and \"What happened that time?\"       Never had anything like this before  8. CAUSE: \"What do you think is causing the stomach pain?\"      Has been told that she is constipated. LBM 3 days ago, was only a little bit. Has been taking laxatives. 3 dulcolax chews yesterday  9. RELIEVING/AGGRAVATING FACTORS: \"What makes it better or worse?\" (e.g., movement, antacids, bowel movement)      no  10. OTHER SYMPTOMS: \"Do you have any other symptoms?\" (e.g., back pain, diarrhea, fever, urination pain, vomiting)        No. No passing gas  11. PREGNANCY: \"Is there any chance you are pregnant?\" \"When was your last menstrual period?\"       Not pregnant. Negative last week    Protocols used: ABDOMINAL PAIN - FEMALE-A-OH      "

## 2023-04-20 DIAGNOSIS — R69 DIAGNOSIS UNKNOWN: Primary | ICD-10-CM

## 2023-04-20 NOTE — TELEPHONE ENCOUNTER
Spoke to the patient. She was told she already has the GI referral so she will call and schedule the appointment.     We discussed OTC stool softeners . She will go to her pharmacy and speak with a pharmacist.     She did have a normal BM yesterday.     Becky Elizabeth RN  [facility]

## 2023-04-20 NOTE — TELEPHONE ENCOUNTER
I have not her seen for this recently. Probably needs evaluation, please check with provider  ,who saw pt recently for constipation recently , as well

## 2023-04-26 ENCOUNTER — TRANSFERRED RECORDS (OUTPATIENT)
Dept: HEALTH INFORMATION MANAGEMENT | Facility: CLINIC | Age: 23
End: 2023-04-26

## 2023-07-11 ENCOUNTER — OFFICE VISIT (OUTPATIENT)
Dept: PEDIATRICS | Facility: CLINIC | Age: 23
End: 2023-07-11
Payer: COMMERCIAL

## 2023-07-11 VITALS
BODY MASS INDEX: 17.74 KG/M2 | SYSTOLIC BLOOD PRESSURE: 97 MMHG | OXYGEN SATURATION: 99 % | TEMPERATURE: 97.7 F | HEART RATE: 79 BPM | HEIGHT: 59 IN | DIASTOLIC BLOOD PRESSURE: 67 MMHG | WEIGHT: 88 LBS

## 2023-07-11 DIAGNOSIS — Z00.00 ANNUAL PHYSICAL EXAM: Primary | ICD-10-CM

## 2023-07-11 DIAGNOSIS — F33.1 MAJOR DEPRESSIVE DISORDER, RECURRENT EPISODE, MODERATE (H): ICD-10-CM

## 2023-07-11 DIAGNOSIS — Z23 NEED FOR HEPATITIS B VACCINATION: ICD-10-CM

## 2023-07-11 DIAGNOSIS — N89.8 VAGINAL DISCHARGE: ICD-10-CM

## 2023-07-11 LAB
CLUE CELLS: ABNORMAL
TRICHOMONAS, WET PREP: ABNORMAL
WBC'S/HIGH POWER FIELD, WET PREP: ABNORMAL
YEAST, WET PREP: ABNORMAL

## 2023-07-11 PROCEDURE — 99395 PREV VISIT EST AGE 18-39: CPT | Mod: 25 | Performed by: PEDIATRICS

## 2023-07-11 PROCEDURE — 90471 IMMUNIZATION ADMIN: CPT | Performed by: PEDIATRICS

## 2023-07-11 PROCEDURE — 87210 SMEAR WET MOUNT SALINE/INK: CPT | Performed by: PEDIATRICS

## 2023-07-11 PROCEDURE — 90619 MENACWY-TT VACCINE IM: CPT | Performed by: PEDIATRICS

## 2023-07-11 PROCEDURE — 90472 IMMUNIZATION ADMIN EACH ADD: CPT | Performed by: PEDIATRICS

## 2023-07-11 PROCEDURE — 90746 HEPB VACCINE 3 DOSE ADULT IM: CPT | Performed by: PEDIATRICS

## 2023-07-11 PROCEDURE — 96127 BRIEF EMOTIONAL/BEHAV ASSMT: CPT | Performed by: PEDIATRICS

## 2023-07-11 ASSESSMENT — PATIENT HEALTH QUESTIONNAIRE - PHQ9
10. IF YOU CHECKED OFF ANY PROBLEMS, HOW DIFFICULT HAVE THESE PROBLEMS MADE IT FOR YOU TO DO YOUR WORK, TAKE CARE OF THINGS AT HOME, OR GET ALONG WITH OTHER PEOPLE: VERY DIFFICULT
SUM OF ALL RESPONSES TO PHQ QUESTIONS 1-9: 20
SUM OF ALL RESPONSES TO PHQ QUESTIONS 1-9: 20

## 2023-07-11 ASSESSMENT — ENCOUNTER SYMPTOMS
FEVER: 0
HEARTBURN: 0
DIARRHEA: 0
ABDOMINAL PAIN: 0
DYSURIA: 0
MYALGIAS: 0
NERVOUS/ANXIOUS: 1
JOINT SWELLING: 0
DIZZINESS: 0
HEADACHES: 0
FREQUENCY: 0
EYE PAIN: 0
CONSTIPATION: 1
COUGH: 0
SORE THROAT: 0
HEMATOCHEZIA: 0
SHORTNESS OF BREATH: 1
NAUSEA: 1
WEAKNESS: 1
HEMATURIA: 0
BREAST MASS: 1
PALPITATIONS: 1
ARTHRALGIAS: 1
PARESTHESIAS: 0
CHILLS: 0

## 2023-07-11 NOTE — PROGRESS NOTES
"mPreventive Care Visit  [facility]  Marylu Michel MD, Pediatrics  Jul 11, 2023    Assessment & Plan   22 year old, here for preventive care.    (Z00.00) Annual physical exam  (primary encounter diagnosis)  Plan: MENINGOCOCCAL ACWY >2Y (MENQUADFI )            (F33.1) Major depressive disorder, recurrent episode, moderate (H)  Comment: patient was on Lexapro for 4 years, declines medication treatment at this time  Plan: Adult Mental Health  Referral            (N89.8) Vaginal discharge  Plan: Wet prep - Clinic Collect            (Z23) Need for hepatitis B vaccination  Plan: HEPATITIS B, ADULT (ENGERIX-B/RECOMBIVAX HB)            Patient has been advised of split billing requirements and indicates understanding: Yes  Growth      Normal height and weight    Immunizations   Appropriate vaccinations were ordered.MenB Vaccine not indicated.    Anticipatory Guidance    Reviewed age appropriate anticipatory guidance.       Referrals/Ongoing Specialty Care  Referrals made, see above    Subjective     Vaginal discharge for 1 month, yellow and itchy.  Getting a little better.  No new sexual exposures, had recent negative testing.  Depression noted, no suicidal ideation.  Has tried therapy in the past and found it helpful.  Would consider again.  Pregnancy prevention: Cecilio is sexually active with male partner (s) and uses condoms.      7/11/2023     8:45 AM   Additional Questions   Accompanied by Mom          No data to display                      No data to display                    No data to display                   No data to display                  Teen Screen    Teen Screen not completed: patient is an adult  Rare alcohol and marijuana use, also smokes tobacco.  No other drug use.       No data to display                   Objective   BP 97/67   Pulse 79   Temp 97.7  F (36.5  C) (Tympanic)   Ht 4' 11\" (1.499 m)   Wt 88 lb (39.9 kg)   SpO2 99%   BMI 17.77 kg/m      Exam      Physical " Exam  Wt Readings from Last 4 Encounters:   07/11/23 88 lb (39.9 kg)   04/14/23 89 lb (40.4 kg)   04/13/23 90 lb (40.8 kg)   03/25/23 87 lb (39.5 kg)       EXAM:  Constitutional: healthy, alert and no distress   Cardiovascular: negative, PMI normal. No lifts, heaves, or thrills. RRR. No murmurs, clicks gallops or rub  Respiratory: negative, Percussion normal. Good diaphragmatic excursion. Lungs clear  Psychiatric: mentation appears normal and affect normal/bright  Head: Normocephalic. No masses, lesions, tenderness or abnormalities  Neck: Neck supple. No adenopathy. Thyroid symmetric, normal size,  ENT: ENT exam normal, no neck nodes or sinus tenderness  : Normal external genitalia without lesions  Breasts: normal without suspicious masses, skin changes or axillary nodes, symmetric fibrous changes in both upper outer quadrants, self exam is taught and encouraged.  NEURO: Gait normal. Reflexes normal and symmetric. Sensation grossly WNL.  SKIN: no suspicious lesions or rashes  LYMPH: Normal cervical lymph nodes  JOINT/EXTREMITIES: extremities normal- no gross deformities noted, gait normal and normal muscle tone    Results for orders placed or performed in visit on 07/11/23   Wet prep - Clinic Collect     Status: Abnormal    Specimen: Vagina; Swab   Result Value Ref Range    Trichomonas Absent Absent    Yeast Absent Absent    Clue Cells Absent Absent    WBCs/high power field 2+ (A) None        Marylu Michel MD  [facility]  Answers for HPI/ROS submitted by the patient on 7/11/2023  If you checked off any problems, how difficult have these problems made it for you to do your work, take care of things at home, or get along with other people?: Very difficult  PHQ9 TOTAL SCORE: 20  Frequency of exercise:: None  Getting at least 3 servings of Calcium per day:: NO  Diet:: Regular (no restrictions)  Taking medications regularly:: Not Applicable  Medication side effects:: Not  applicable  Bi-annual eye exam:: NO  Dental care twice a year:: NO  Sleep apnea or symptoms of sleep apnea:: Daytime drowsiness  abdominal pain: No  Blood in stool: No  Blood in urine: No  chest pain: Yes  chills: No  congestion: No  constipation: Yes  cough: No  diarrhea: No  dizziness: No  ear pain: No  eye pain: No  nervous/anxious: Yes  fever: No  frequency: No  genital sores: No  headaches: No  hearing loss: No  heartburn: No  arthralgias: Yes  joint swelling: No  peripheral edema: No  mood changes: Yes  myalgias: No  nausea: Yes  dysuria: No  palpitations: Yes  Skin sensation changes: No  sore throat: No  urgency: No  rash: No  shortness of breath: Yes  visual disturbance: No  weakness: Yes  pelvic pain: Yes  vaginal bleeding: No  vaginal discharge: Yes  tenderness: No  breast mass: Yes  breast discharge: No  Additional concerns today:: Yes

## 2024-02-20 ENCOUNTER — OFFICE VISIT (OUTPATIENT)
Dept: FAMILY MEDICINE | Facility: CLINIC | Age: 24
End: 2024-02-20
Payer: COMMERCIAL

## 2024-02-20 VITALS
SYSTOLIC BLOOD PRESSURE: 98 MMHG | TEMPERATURE: 97.6 F | DIASTOLIC BLOOD PRESSURE: 61 MMHG | HEART RATE: 74 BPM | OXYGEN SATURATION: 98 %

## 2024-02-20 DIAGNOSIS — H04.551 OBSTRUCTION OF RIGHT TEAR DUCT: Primary | ICD-10-CM

## 2024-02-20 PROCEDURE — 99213 OFFICE O/P EST LOW 20 MIN: CPT

## 2024-02-20 ASSESSMENT — ENCOUNTER SYMPTOMS
PHOTOPHOBIA: 0
EYE DISCHARGE: 0
EYE ITCHING: 0
CONSTITUTIONAL NEGATIVE: 1
EYE REDNESS: 0
EYE PAIN: 0

## 2024-02-20 NOTE — PROGRESS NOTES
Assessment & Plan       ICD-10-CM    1. Obstruction of right tear duct  H04.551            Massage to her duct, apply warm compress to eye, monitor for new or worsening symptoms.  To follow-up with eye doctor for recheck.    Patient Instructions   Massage tear duct in AM and PM  Use lubricated eye drops ex. Refresh  Hot compress to the area if you notice swelling  Cold compress when more irritated        Follow up with primary care provider with any problems, questions or concerns or if symptoms worsen or fail to improve. Patient agreed to plan and verbalized understanding.     Subjective     Ayana is a 23 year old female who presents to clinic today for the following health issues:  Chief Complaint   Patient presents with    Urgent Care    Eye Problem     Right eye- ongoing since October. Have not done anything.      Ayana presents with reports of right eye twitching and episodic swelling of the inner lower eyelid. She denies vision changes, trauma, injuries, contact lens use.             Review of Systems   Constitutional: Negative.    HENT: Negative.     Eyes:  Negative for photophobia, pain, discharge, redness, itching and visual disturbance.       Problem List:  2022: Asthma  2021: Left breast lump  2021: Major depressive disorder, recurrent episode, moderate (H)  2019: Suicidal ideation  2015: Adjustment disorder with mixed anxiety and depressed mood  2015: Self-inflicted injury  2015: Suicide and self-inflicted injury by cutting and piercing   instrument (H)  2014: Menarche  2014: Nausea without vomiting      Past Medical History:   Diagnosis Date    Anxiety     Depression        Social History     Tobacco Use    Smoking status: Every Day     Types: Vaping Device     Last attempt to quit: 3/21/2023     Years since quittin.9    Smokeless tobacco: Never   Substance Use Topics    Alcohol use: No     Alcohol/week: 0.0 standard drinks of alcohol           Objective    BP 98/61    Pulse 74   Temp 97.6  F (36.4  C) (Tympanic)   LMP 02/14/2024   SpO2 98%   Physical Exam  Constitutional:       Appearance: Normal appearance.   HENT:      Head: Normocephalic and atraumatic.   Eyes:      General: Lids are normal.         Right eye: No foreign body, discharge or hordeolum.         Left eye: No foreign body, discharge or hordeolum.      Extraocular Movements: Extraocular movements intact.      Conjunctiva/sclera: Conjunctivae normal.      Pupils: Pupils are equal, round, and reactive to light.   Musculoskeletal:      Cervical back: Normal range of motion and neck supple.   Skin:     General: Skin is warm and dry.   Neurological:      General: No focal deficit present.      Mental Status: She is alert and oriented to person, place, and time.   Psychiatric:         Mood and Affect: Mood normal.         Behavior: Behavior normal.         Thought Content: Thought content normal.         Judgment: Judgment normal.              Higinio Dailey PA-C

## 2024-02-20 NOTE — PATIENT INSTRUCTIONS
Massage tear duct in AM and PM  Use lubricated eye drops ex. Refresh  Hot compress to the area if you notice swelling  Cold compress when more irritated

## 2024-05-26 ENCOUNTER — OFFICE VISIT (OUTPATIENT)
Dept: FAMILY MEDICINE | Facility: CLINIC | Age: 24
End: 2024-05-26
Payer: COMMERCIAL

## 2024-05-26 VITALS
OXYGEN SATURATION: 98 % | DIASTOLIC BLOOD PRESSURE: 63 MMHG | RESPIRATION RATE: 20 BRPM | WEIGHT: 94.4 LBS | BODY MASS INDEX: 19.07 KG/M2 | SYSTOLIC BLOOD PRESSURE: 95 MMHG | HEART RATE: 76 BPM | TEMPERATURE: 98.4 F

## 2024-05-26 DIAGNOSIS — R63.0 LACK OF APPETITE: ICD-10-CM

## 2024-05-26 DIAGNOSIS — R31.0 GROSS HEMATURIA: ICD-10-CM

## 2024-05-26 DIAGNOSIS — Z11.3 SCREEN FOR STD (SEXUALLY TRANSMITTED DISEASE): ICD-10-CM

## 2024-05-26 DIAGNOSIS — R30.0 DYSURIA: Primary | ICD-10-CM

## 2024-05-26 LAB
ALBUMIN UR-MCNC: 100 MG/DL
APPEARANCE UR: CLEAR
BACTERIA #/AREA URNS HPF: ABNORMAL /HPF
BILIRUB UR QL STRIP: NEGATIVE
COLOR UR AUTO: ABNORMAL
GLUCOSE UR STRIP-MCNC: NEGATIVE MG/DL
HGB UR QL STRIP: ABNORMAL
KETONES UR STRIP-MCNC: NEGATIVE MG/DL
LEUKOCYTE ESTERASE UR QL STRIP: ABNORMAL
NITRATE UR QL: NEGATIVE
PH UR STRIP: 7 [PH] (ref 5–7)
RBC URINE: 6 /HPF
SP GR UR STRIP: 1.01 (ref 1–1.03)
SQUAMOUS EPITHELIAL: 2 /HPF
UROBILINOGEN UR STRIP-ACNC: 0.2 E.U./DL
WBC URINE: >182 /HPF

## 2024-05-26 PROCEDURE — 87086 URINE CULTURE/COLONY COUNT: CPT | Performed by: FAMILY MEDICINE

## 2024-05-26 PROCEDURE — 87491 CHLMYD TRACH DNA AMP PROBE: CPT | Performed by: FAMILY MEDICINE

## 2024-05-26 PROCEDURE — 81001 URINALYSIS AUTO W/SCOPE: CPT | Performed by: FAMILY MEDICINE

## 2024-05-26 PROCEDURE — 87088 URINE BACTERIA CULTURE: CPT | Performed by: FAMILY MEDICINE

## 2024-05-26 PROCEDURE — 87591 N.GONORRHOEAE DNA AMP PROB: CPT | Performed by: FAMILY MEDICINE

## 2024-05-26 PROCEDURE — 99214 OFFICE O/P EST MOD 30 MIN: CPT | Performed by: FAMILY MEDICINE

## 2024-05-26 RX ORDER — NITROFURANTOIN 25; 75 MG/1; MG/1
100 CAPSULE ORAL 2 TIMES DAILY
Qty: 10 CAPSULE | Refills: 0 | Status: SHIPPED | OUTPATIENT
Start: 2024-05-26 | End: 2024-05-31

## 2024-05-26 NOTE — PROGRESS NOTES
URGENT CARE VISIT:    ASSESSMENT AND PLAN:      ICD-10-CM    1. Dysuria  R30.0 UA without Microscopic     UA without Microscopic     nitroFURantoin macrocrystal-monohydrate (MACROBID) 100 MG capsule      2. Gross hematuria  R31.0 nitroFURantoin macrocrystal-monohydrate (MACROBID) 100 MG capsule      3. Screen for STD (sexually transmitted disease)  Z11.3 Chlamydia trachomatis/Neisseria gonorrhoeae by PCR - Clinic Collect      4. Lack of appetite  R63.0             Differential Diagnosis include but not limited to Dysuria, Pyelonephritis, Kidney Stone, Malignancy, STD, and Interstitial Cystitis.  Urine macro with large amount of blood  identified and the small leukocyte estrace.  Urine micro and culture pending.  She has no unilateral flank pain tenderness or kidney stone history.  At this time with urinary symptoms we will treat as UTI with nitrofurantoin twice daily for 5 days; finishing full course and use of probiotics was discussed with patient.  Supportive and OTC measures outlined in AVS and discussed with patient.  Chlamydia and gonorrhea is pending.  We did discuss that if urine culture negative, urinary symptoms, and continued blood in urine she will need to be reevaluated.    Patient reports longstanding history of lack of appetite for greater than 6+ months now.  She does have a history of eating disorder and other mental health diagnosis.  I have asked her to follow-up closely with her PCP for further evaluation of this.    Red flag symptoms needing urgent evaluation discussed and printed off.    Follow up with primary care provider with any problems, questions or concerns or if symptoms worsen or fail to improve. Patient verbalized understanding and is agreeable to plan. The patient was discharged ambulatory and in stable condition.           SUBJECTIVE:   Ayana Hollins is a 23 year old female who presents today for a possible UTI. Symptoms of dysuria, urgency, frequency, burning, and gross hematuria  have been going on for 3 day(s). Symptoms were gradual onset and moderate.  Patient denies abdominal pain, back pain, nausea, vomiting, fever, and chills or vaginal symptoms. This patient does not have a history of urinary tract infections or kidney stones.  She does have unprotected intercourse and would like to get STI tests done today.    LMP:  -         PMH:   Past Medical History:   Diagnosis Date    Anxiety     Depression      Allergies: Patient has no known allergies.   Medications:   Current Outpatient Medications   Medication Sig Dispense Refill    nitroFURantoin macrocrystal-monohydrate (MACROBID) 100 MG capsule Take 1 capsule (100 mg) by mouth 2 times daily for 5 days 10 capsule 0     Social History:   Social History     Tobacco Use    Smoking status: Every Day     Types: Vaping Device     Last attempt to quit: 3/21/2023     Years since quittin.1    Smokeless tobacco: Never   Substance Use Topics    Alcohol use: No     Alcohol/week: 0.0 standard drinks of alcohol       ROS:  Review of systems negative except as stated above.    OBJECTIVE:  BP 95/63 (BP Location: Left arm, Patient Position: Sitting, Cuff Size: Adult Small)   Pulse 76   Temp 98.4  F (36.9  C) (Tympanic)   Resp 20   Wt 42.8 kg (94 lb 6.4 oz)   SpO2 98%   BMI 19.07 kg/m      GENERAL APPEARANCE: healthy, alert and no distress  EYES: EOMI,  PERRL, conjunctiva clear  HENT: ear canals and TM's normal.  Nose and mouth without ulcers, erythema or lesions  NECK: supple, nontender, no lymphadenopathy  RESP: lungs clear to auscultation - no rales, rhonchi or wheezes  CV: regular rates and rhythm, normal S1 S2, no murmur noted  ABDOMEN:  soft, nontender, no HSM or masses and bowel sounds normal  SKIN: no suspicious lesions or rashes  Back: negative CVA tenderness    Labs:      Results for orders placed or performed in visit on 24   UA without Microscopic     Status: Abnormal   Result Value Ref Range    Color Urine Red (A)  Colorless, Straw, Light Yellow, Yellow    Appearance Urine Clear Clear    Glucose Urine Negative Negative mg/dL    Bilirubin Urine Negative Negative    Ketones Urine Negative Negative mg/dL    Specific Gravity Urine 1.010 1.003 - 1.035    Blood Urine Large (A) Negative    pH Urine 7.0 5.0 - 7.0    Protein Albumin Urine 100 (A) Negative mg/dL    Urobilinogen Urine 0.2 0.2, 1.0 E.U./dL    Nitrite Urine Negative Negative    Leukocyte Esterase Urine Small (A) Negative

## 2024-05-26 NOTE — PATIENT INSTRUCTIONS
Patient Instructions     1. Increase fluid intake  2. Add probiotic to prevent GI side effects from antibiotic if prescribed.   3. Complete antibiotic regimen if prescribed. You will be notified if the treatment plan needs to be changed based on the urine culture results.   4. For the discomfort: you may take an over the counter medication called pyridium (AZO) up three times daily for up to 2 days.  5. Follow up if you have a fever of 100.4 F (38 C) or higher, no improvement after three days of treatment, trouble urinating because of pain, new or increased discharge from the vagina, rash or joint pain, and increased back or abdominal pain.    STD results will show on my chart. If + a script will be called for you.

## 2024-05-27 LAB
C TRACH DNA SPEC QL PROBE+SIG AMP: NEGATIVE
N GONORRHOEA DNA SPEC QL NAA+PROBE: NEGATIVE

## 2024-05-28 ENCOUNTER — TELEPHONE (OUTPATIENT)
Dept: URGENT CARE | Facility: URGENT CARE | Age: 24
End: 2024-05-28
Payer: COMMERCIAL

## 2024-05-28 DIAGNOSIS — N30.00 ACUTE CYSTITIS WITHOUT HEMATURIA: Primary | ICD-10-CM

## 2024-05-28 LAB — BACTERIA UR CULT: ABNORMAL

## 2024-05-28 RX ORDER — CEPHALEXIN 500 MG/1
500 CAPSULE ORAL 2 TIMES DAILY
Qty: 14 CAPSULE | Refills: 0 | Status: SHIPPED | OUTPATIENT
Start: 2024-05-28 | End: 2024-06-04

## 2024-06-11 ENCOUNTER — PATIENT OUTREACH (OUTPATIENT)
Dept: CARE COORDINATION | Facility: CLINIC | Age: 24
End: 2024-06-11
Payer: COMMERCIAL

## 2024-06-25 ENCOUNTER — PATIENT OUTREACH (OUTPATIENT)
Dept: CARE COORDINATION | Facility: CLINIC | Age: 24
End: 2024-06-25
Payer: COMMERCIAL

## 2024-07-23 ENCOUNTER — OFFICE VISIT (OUTPATIENT)
Dept: FAMILY MEDICINE | Facility: CLINIC | Age: 24
End: 2024-07-23
Payer: COMMERCIAL

## 2024-07-23 ENCOUNTER — MYC MEDICAL ADVICE (OUTPATIENT)
Dept: FAMILY MEDICINE | Facility: CLINIC | Age: 24
End: 2024-07-23

## 2024-07-23 VITALS
SYSTOLIC BLOOD PRESSURE: 98 MMHG | RESPIRATION RATE: 18 BRPM | TEMPERATURE: 97.7 F | BODY MASS INDEX: 20 KG/M2 | HEART RATE: 100 BPM | DIASTOLIC BLOOD PRESSURE: 65 MMHG | OXYGEN SATURATION: 97 % | HEIGHT: 59 IN | WEIGHT: 99.2 LBS

## 2024-07-23 DIAGNOSIS — R10.12 ABDOMINAL PAIN, LEFT UPPER QUADRANT: Primary | ICD-10-CM

## 2024-07-23 DIAGNOSIS — F33.1 MAJOR DEPRESSIVE DISORDER, RECURRENT EPISODE, MODERATE (H): ICD-10-CM

## 2024-07-23 DIAGNOSIS — R53.83 FATIGUE, UNSPECIFIED TYPE: ICD-10-CM

## 2024-07-23 LAB
ERYTHROCYTE [DISTWIDTH] IN BLOOD BY AUTOMATED COUNT: 11 % (ref 10–15)
HCG SERPL QL: NEGATIVE
HCT VFR BLD AUTO: 42.6 % (ref 35–47)
HGB BLD-MCNC: 13.9 G/DL (ref 11.7–15.7)
MCH RBC QN AUTO: 29 PG (ref 26.5–33)
MCHC RBC AUTO-ENTMCNC: 32.6 G/DL (ref 31.5–36.5)
MCV RBC AUTO: 89 FL (ref 78–100)
PLATELET # BLD AUTO: 340 10E3/UL (ref 150–450)
RBC # BLD AUTO: 4.8 10E6/UL (ref 3.8–5.2)
WBC # BLD AUTO: 7.3 10E3/UL (ref 4–11)

## 2024-07-23 PROCEDURE — 99214 OFFICE O/P EST MOD 30 MIN: CPT | Performed by: FAMILY MEDICINE

## 2024-07-23 PROCEDURE — 36415 COLL VENOUS BLD VENIPUNCTURE: CPT | Performed by: FAMILY MEDICINE

## 2024-07-23 PROCEDURE — 80053 COMPREHEN METABOLIC PANEL: CPT | Performed by: FAMILY MEDICINE

## 2024-07-23 PROCEDURE — 84703 CHORIONIC GONADOTROPIN ASSAY: CPT | Performed by: FAMILY MEDICINE

## 2024-07-23 PROCEDURE — 84443 ASSAY THYROID STIM HORMONE: CPT | Performed by: FAMILY MEDICINE

## 2024-07-23 PROCEDURE — 83690 ASSAY OF LIPASE: CPT | Performed by: FAMILY MEDICINE

## 2024-07-23 PROCEDURE — 85027 COMPLETE CBC AUTOMATED: CPT | Performed by: FAMILY MEDICINE

## 2024-07-23 PROCEDURE — 82306 VITAMIN D 25 HYDROXY: CPT | Performed by: FAMILY MEDICINE

## 2024-07-23 PROCEDURE — 86140 C-REACTIVE PROTEIN: CPT | Performed by: FAMILY MEDICINE

## 2024-07-23 SDOH — HEALTH STABILITY: PHYSICAL HEALTH: ON AVERAGE, HOW MANY DAYS PER WEEK DO YOU ENGAGE IN MODERATE TO STRENUOUS EXERCISE (LIKE A BRISK WALK)?: 2 DAYS

## 2024-07-23 SDOH — HEALTH STABILITY: PHYSICAL HEALTH: ON AVERAGE, HOW MANY MINUTES DO YOU ENGAGE IN EXERCISE AT THIS LEVEL?: 0 MIN

## 2024-07-23 ASSESSMENT — PAIN SCALES - GENERAL: PAINLEVEL: MILD PAIN (3)

## 2024-07-23 ASSESSMENT — SOCIAL DETERMINANTS OF HEALTH (SDOH): HOW OFTEN DO YOU GET TOGETHER WITH FRIENDS OR RELATIVES?: ONCE A WEEK

## 2024-07-23 ASSESSMENT — PATIENT HEALTH QUESTIONNAIRE - PHQ9
10. IF YOU CHECKED OFF ANY PROBLEMS, HOW DIFFICULT HAVE THESE PROBLEMS MADE IT FOR YOU TO DO YOUR WORK, TAKE CARE OF THINGS AT HOME, OR GET ALONG WITH OTHER PEOPLE: VERY DIFFICULT
SUM OF ALL RESPONSES TO PHQ QUESTIONS 1-9: 15
SUM OF ALL RESPONSES TO PHQ QUESTIONS 1-9: 15

## 2024-07-23 NOTE — PROGRESS NOTES
Assessment & Plan     Abdominal pain, left upper quadrant  -Ayana has been noticing abdominal discomfort in the left upper quadrant intermittently for the past 1 month.  -Denied heartburn issues/nausea/vomiting/diarrhea/dysuria/menstrual issues.  -Sexually active,not on contraception.  -Alcohol use socially once every month.   -Occasionally smokes marijuana.  -Having lack of appetite , but denied issues with eating behaviors.     O/E:  -Slight tenderness elicited in left upper quadrant.    PLAN:  -Ordered labs, CT scan once pregnancy rule out.    - CBC with platelets; Future  - Comprehensive metabolic panel (BMP + Alb, Alk Phos, ALT, AST, Total. Bili, TP); Future  - CRP, inflammation; Future  - Lipase; Future  - HCG qualitative; Future  - CT Abdomen Pelvis w/o Contrast; Future    Fatigue, unspecified type  -Etiology multifactorial.  Ordered basic labs.    - CBC with platelets; Future  - Vitamin D Deficiency; Future  - TSH with free T4 reflex; Future    Major depressive disorder, recurrent episode, moderate (H)  -Ayana stated she has been having low mood but manageable.  -Denied help with medications or therapy.  -Denied thoughts of hurting herself.  -Aware to go to ER if any thoughts of hurting herself.      Nicotine/Tobacco Cessation  She reports that she has been smoking vaping device. She has never used smokeless tobacco.  Nicotine/Tobacco Cessation Plan        Counseling  Appropriate preventive services were addressed with this patient via screening, questionnaire, or discussion as appropriate for fall prevention, nutrition, physical activity, Tobacco-use cessation, weight loss and cognition.  Checklist reviewing preventive services available has been given to the patient.  Reviewed patient's diet, addressing concerns and/or questions.   She is at risk for lack of exercise and has been provided with information to increase physical activity for the benefit of her well-being.   The patient was instructed  "to see the dentist every 6 months.   The patient's PHQ-9 score is consistent with moderate depression. She was provided with information regarding depression.           Atul Piña is a 23 year old, presenting for the following health issues:  Abdominal Pain and Dizziness      7/23/2024     1:25 PM   Additional Questions   Roomed by Stephanie   Accompanied by self     History of Present Illness       Reason for visit:  Abdominal Pain and Dizziness  Symptom onset:  More than a month  Symptoms include:  Pain in abdomen,dizziness,arms go numb  Symptom intensity:  Moderate  Symptom progression:  Worsening  Had these symptoms before:  No  What makes it worse:  Heat  What makes it better:  Eating    She eats 2-3 servings of fruits and vegetables daily.She consumes 0 sweetened beverage(s) daily.She exercises with enough effort to increase her heart rate 9 or less minutes per day.  She exercises with enough effort to increase her heart rate 3 or less days per week.   She is taking medications regularly.                 Review of Systems  Constitutional, HEENT, cardiovascular, pulmonary, gi and gu systems are negative, except as otherwise noted.      Objective    BP 98/65 (BP Location: Left arm, Patient Position: Sitting, Cuff Size: Child)   Pulse 100   Temp 97.7  F (36.5  C) (Temporal)   Resp 18   Ht 1.499 m (4' 11.02\")   Wt 45 kg (99 lb 3.2 oz)   LMP 07/12/2024 (Approximate)   SpO2 97%   BMI 20.03 kg/m    Body mass index is 20.03 kg/m .  Physical Exam   GENERAL: alert and no distress  NECK: no adenopathy, no asymmetry, masses, or scars  RESP: lungs clear to auscultation - no rales, rhonchi or wheezes  CV: regular rate and rhythm, normal S1 S2, no S3 or S4, no murmur, click or rub, no peripheral edema  ABDOMEN: soft,mild tenderness elicited in LUQ  MS: no gross musculoskeletal defects noted, no edema            Signed Electronically by: Gabby Fink MD    "

## 2024-07-24 ENCOUNTER — TELEPHONE (OUTPATIENT)
Dept: FAMILY MEDICINE | Facility: CLINIC | Age: 24
End: 2024-07-24
Payer: COMMERCIAL

## 2024-07-24 LAB
ALBUMIN SERPL BCG-MCNC: 4.7 G/DL (ref 3.5–5.2)
ALP SERPL-CCNC: 57 U/L (ref 40–150)
ALT SERPL W P-5'-P-CCNC: 15 U/L (ref 0–50)
ANION GAP SERPL CALCULATED.3IONS-SCNC: 10 MMOL/L (ref 7–15)
AST SERPL W P-5'-P-CCNC: 19 U/L (ref 0–45)
BILIRUB SERPL-MCNC: <0.2 MG/DL
BUN SERPL-MCNC: 17.7 MG/DL (ref 6–20)
CALCIUM SERPL-MCNC: 9.7 MG/DL (ref 8.8–10.4)
CHLORIDE SERPL-SCNC: 103 MMOL/L (ref 98–107)
CREAT SERPL-MCNC: 0.87 MG/DL (ref 0.51–0.95)
CRP SERPL-MCNC: <3 MG/L
EGFRCR SERPLBLD CKD-EPI 2021: >90 ML/MIN/1.73M2
GLUCOSE SERPL-MCNC: 79 MG/DL (ref 70–99)
HCO3 SERPL-SCNC: 27 MMOL/L (ref 22–29)
LIPASE SERPL-CCNC: 27 U/L (ref 13–60)
POTASSIUM SERPL-SCNC: 4.1 MMOL/L (ref 3.4–5.3)
PROT SERPL-MCNC: 7.7 G/DL (ref 6.4–8.3)
SODIUM SERPL-SCNC: 140 MMOL/L (ref 135–145)
TSH SERPL DL<=0.005 MIU/L-ACNC: 1.33 UIU/ML (ref 0.3–4.2)
VIT D+METAB SERPL-MCNC: 28 NG/ML (ref 20–50)

## 2024-07-24 NOTE — TELEPHONE ENCOUNTER
This RN attempted to reach patient on 7/24/24 in response to Friend [employer]er message sent in.  Left message for patient to return call.      If patient calls back:    Please triage symptoms as detailed in inZairhart message below.      Patient seen on 7/24/24 for abd pain and dizziness.  From message these appear to be different symptoms.       Kristina Kjellberg, MSN, RN    I forgot to mention at my appointment today that my entire top of my head and ears feels like there is a bunch of pressure in it. It does not hurt like a headache there is just a lot of pressure it feels like the top of my head is being squeezed. And my heartbeat is so intense it shakes my entire body when I lay down. And I can feel a heart beat sensation in my head and behind my neck. I have had this feeling for a month and it s getting worse. When I look around my eyes are slow and I feel very weak and faint. My eyes feel so heavy it s hard to keep them open. My eyes are always dry and burn. My lower back aches a lot too and sometimes between my shoulder blades frequently.

## 2024-07-25 NOTE — TELEPHONE ENCOUNTER
Pt has not viewed Pops message sent on 7/24.    This writer attempted to contact patient on 07/25/24      Reason for call triage and left message.      If patient calls back:   Registered Nurse called. Follow Triage Call workflow        Sravani Stauffer RN

## 2024-07-26 NOTE — TELEPHONE ENCOUNTER
This writer attempted to contact patient on 07/26/24      Reason for call triage headache and unable to leave message.      If patient calls back:   Registered Nurse called. Follow Triage Call workflow        Nicky Hernandez RN

## 2024-07-29 NOTE — TELEPHONE ENCOUNTER
Patient read mychart message.          Did not reach out to clinic regarding symptoms.  Closing encounter.      Kristina Kjellberg, MSN, RN

## 2024-08-01 ENCOUNTER — HOSPITAL ENCOUNTER (EMERGENCY)
Facility: CLINIC | Age: 24
Discharge: HOME | End: 2024-08-01
Attending: EMERGENCY MEDICINE | Admitting: EMERGENCY MEDICINE
Payer: COMMERCIAL

## 2024-08-01 ENCOUNTER — APPOINTMENT (OUTPATIENT)
Dept: CT IMAGING | Facility: CLINIC | Age: 24
End: 2024-08-01
Attending: EMERGENCY MEDICINE
Payer: COMMERCIAL

## 2024-08-01 VITALS
TEMPERATURE: 97.3 F | OXYGEN SATURATION: 100 % | RESPIRATION RATE: 16 BRPM | HEART RATE: 82 BPM | SYSTOLIC BLOOD PRESSURE: 102 MMHG | DIASTOLIC BLOOD PRESSURE: 60 MMHG

## 2024-08-01 DIAGNOSIS — F32.89 OTHER DEPRESSION: ICD-10-CM

## 2024-08-01 DIAGNOSIS — U07.1 COVID-19 VIRUS INFECTION: ICD-10-CM

## 2024-08-01 PROBLEM — F41.9 ANXIETY: Status: ACTIVE | Noted: 2024-08-01

## 2024-08-01 PROBLEM — F32.9 MAJOR DEPRESSION: Status: ACTIVE | Noted: 2024-08-01

## 2024-08-01 LAB
ATRIAL RATE - MUSE: 69 BPM
B BURGDOR IGG+IGM SER QL: 0.12
DIASTOLIC BLOOD PRESSURE - MUSE: NORMAL MMHG
FLUAV RNA SPEC QL NAA+PROBE: NEGATIVE
FLUBV RNA RESP QL NAA+PROBE: NEGATIVE
GROUP A STREP BY PCR: NOT DETECTED
INTERPRETATION ECG - MUSE: NORMAL
P AXIS - MUSE: 29 DEGREES
PR INTERVAL - MUSE: 164 MS
QRS DURATION - MUSE: 74 MS
QT - MUSE: 394 MS
QTC - MUSE: 422 MS
R AXIS - MUSE: 57 DEGREES
RSV RNA SPEC NAA+PROBE: NEGATIVE
SARS-COV-2 RNA RESP QL NAA+PROBE: POSITIVE
SYSTOLIC BLOOD PRESSURE - MUSE: NORMAL MMHG
T AXIS - MUSE: 43 DEGREES
VENTRICULAR RATE- MUSE: 69 BPM

## 2024-08-01 PROCEDURE — 99285 EMERGENCY DEPT VISIT HI MDM: CPT | Mod: 25

## 2024-08-01 PROCEDURE — 70450 CT HEAD/BRAIN W/O DYE: CPT

## 2024-08-01 PROCEDURE — 87637 SARSCOV2&INF A&B&RSV AMP PRB: CPT | Performed by: EMERGENCY MEDICINE

## 2024-08-01 PROCEDURE — 87651 STREP A DNA AMP PROBE: CPT | Performed by: EMERGENCY MEDICINE

## 2024-08-01 PROCEDURE — 86618 LYME DISEASE ANTIBODY: CPT | Performed by: EMERGENCY MEDICINE

## 2024-08-01 PROCEDURE — 93005 ELECTROCARDIOGRAM TRACING: CPT

## 2024-08-01 PROCEDURE — 36415 COLL VENOUS BLD VENIPUNCTURE: CPT | Performed by: EMERGENCY MEDICINE

## 2024-08-01 ASSESSMENT — COLUMBIA-SUICIDE SEVERITY RATING SCALE - C-SSRS
ATTEMPT PAST THREE MONTHS: NO
1. HAVE YOU WISHED YOU WERE DEAD OR WISHED YOU COULD GO TO SLEEP AND NOT WAKE UP?: YES
4. HAVE YOU HAD THESE THOUGHTS AND HAD SOME INTENTION OF ACTING ON THEM?: YES
ATTEMPT LIFETIME: YES
4. HAVE YOU HAD THESE THOUGHTS AND HAD SOME INTENTION OF ACTING ON THEM?: NO
3. HAVE YOU BEEN THINKING ABOUT HOW YOU MIGHT KILL YOURSELF?: YES
2. HAVE YOU ACTUALLY HAD ANY THOUGHTS OF KILLING YOURSELF?: YES
REASONS FOR IDEATION LIFETIME: MOSTLY TO END OR STOP THE PAIN (YOU COULDN'T GO ON LIVING WITH THE PAIN OR HOW YOU WERE FEELING)
1. IN THE PAST MONTH, HAVE YOU WISHED YOU WERE DEAD OR WISHED YOU COULD GO TO SLEEP AND NOT WAKE UP?: NO
2. HAVE YOU ACTUALLY HAD ANY THOUGHTS OF KILLING YOURSELF?: NO
5. HAVE YOU STARTED TO WORK OUT OR WORKED OUT THE DETAILS OF HOW TO KILL YOURSELF? DO YOU INTEND TO CARRY OUT THIS PLAN?: NO
5. HAVE YOU STARTED TO WORK OUT OR WORKED OUT THE DETAILS OF HOW TO KILL YOURSELF? DO YOU INTEND TO CARRY OUT THIS PLAN?: YES
REASONS FOR IDEATION PAST MONTH: DOES NOT APPLY
TOTAL  NUMBER OF ACTUAL ATTEMPTS LIFETIME: 2

## 2024-08-01 ASSESSMENT — ACTIVITIES OF DAILY LIVING (ADL)
ADLS_ACUITY_SCORE: 35

## 2024-08-01 NOTE — DISCHARGE INSTRUCTIONS
Your Upcoming Appointment:    Type: Teletherapy  Date/Time: Thursday, 8/8/2024  @ 7:00 pm - 8:00 pm  Provider: Zaira Rodriguez MA  [facility]  Location: [facility], [facility], [address] [place] [address]  Phone: [phone]

## 2024-08-01 NOTE — ED TRIAGE NOTES
Patient has multiple complaints. Has been feeling dizzy, with headaches, confusion, back pain, sore throat, lump on the back of her neck and ear pain that have been going on for the last month.

## 2024-08-01 NOTE — CONSULTS
Diagnostic Evaluation Consultation  Crisis Assessment    Patient Name: Ayana Hollins  Age:  23 year old  Legal Sex: female  Gender Identity: female  Pronouns:   Race:   Ethnicity: Not  or   Language: English      Patient was assessed: In person   Crisis Assessment Start Date: 08/01/24  Crisis Assessment Start Time: 0900  Crisis Assessment Stop Time: 0927  Patient location: [facility] EMERGENCY DEPT                                 Referral Data and Chief Complaint  Ayana Hollins presents to the ED by  self. Patient is presenting to the ED for the following concerns: Anxiety, Health stressors.   Factors that make the mental health crisis life threatening or complex are:  Pt presents to the ED with reported concern of physical ailments and increase in anxiety due to physical health. Pt reports she has been physically unwell for the past few days. Upon arriving to the ED, it was determined Pt tested positive for COVID. When discussing mental health symptoms, Pt reports increase in anxiety symptoms such as poor sleep and change in appetite. Pt denies any SI, HI, AH/VH. Pt reports history of suicide attempts, both while 19 by overdosing or cutting herself. Pt reports she was hospitalized at [facility]. Pt reports she smokes nicotine through a vape. Pt reports she is looking to get connected with a therapist to discuss her anxiety and feels safe to discharge after being medically cleared by the care team. She is not interested in getting started on psychotropic medication.      Informed Consent and Assessment Methods  Explained the crisis assessment process, including applicable information disclosures and limits to confidentiality, assessed understanding of the process, and obtained consent to proceed with the assessment.  Assessment methods included conducting a formal interview with patient, review of medical records, collaboration with medical staff, and obtaining relevant collateral  information from family and community providers when available.  : done     Patient response to interventions: acceptance expressed, verbalizes understanding  Coping skills were attempted to reduce the crisis:  Voluntarily presented to the ED.     History of the Crisis   Pt reports she has been living with her anxiety for a majority of her life. Pt reports she used to work with a therapist from [age].    Brief Psychosocial History  Family:   , Children    Support System:  Friend, Parent(s)  Employment Status:  employed full-time  Source of Income:  salary/wages  Financial Environmental Concerns:  none  Current Hobbies:  arts/crafts, outdoor activities  Barriers in Personal Life:  medical conditions/precautions    Significant Clinical History  Current Anxiety Symptoms:  anxious (Sleep disturbance)  Current Depression/Trauma:     Current Somatic Symptoms:     Current Psychosis/Thought Disturbance:     Current Eating Symptoms:  loss of appetite  Chemical Use History:  Alcohol: None  Benzodiazepines: None  Opiates: None  Cocaine: None  Marijuana: None  Other Use: None   Past diagnosis:  Anxiety Disorder, Depression  Family history:  Anxiety Disorder, Substance Use Disorder  Past treatment:  Individual therapy, Primary Care  Details of most recent treatment:  Pt reports working with a PCP.  Other relevant history:  Pt reports history of working with a therapist and using medication that was prescribed by her PCP.       Collateral Information  Is there collateral information: Yes     Collateral information name, relationship, phone number:  mother Willis, [phone]    What happened today: She shared Pt came in due to not feeling well.     What is different about patient's functioning: Easily agitated, more fidgety and emotional, having a hard time focusing on tasks.     Concern about alcohol/drug use:  smoking nicotine    What do you think the patient needs:  Thinks Pt needs to start working with a therapist to  unload her thoughts to.    Has patient made comments about wanting to kill themselves/others: no    If d/c is recommended, can they take part in safety/aftercare planning:  yes    Additional collateral information:  Thinks Pt needs to start working with a therapist to unload her thoughts to.     Risk Assessment  Duvall Suicide Severity Rating Scale Full Clinical Version:  Suicidal Ideation  3. Active Suicidal Ideation with any Methods (Not Plan) Without Intent to Act (Lifetime): Yes  Q4 Active Suicidal Ideation with Some Intent to Act, Without Specific Plan (Lifetime): Yes  Q5 Active Suicidal Ideation with Specific Plan and Intent (Lifetime): Yes  Q6 Suicide Behavior (Lifetime): no     Suicidal Behavior (Lifetime)  Actual Attempt (Lifetime): Yes  Total Number of Actual Attempts (Lifetime): 2  Actual Attempt Description (Lifetime): Pt reports attempting suicide by overdosing and cutting herself when she was 19 years old.  Has subject engaged in non-suicidal self-injurious behavior? (Lifetime): No    Duvall Suicide Severity Rating Scale Recent:   Suicidal Ideation (Recent)  Q1 Wished to be Dead (Past Month): no  Q2 Suicidal Thoughts (Past Month): no  Level of Risk per Screen: no risks indicated  Intensity of Ideation (Recent)  Most Severe Ideation Rating (Past 1 Month):  (Does not apply. Denies SI)  Description of Most Severe Ideation (Past 1 Month): Does not apply. Denies SI  Frequency (Past 1 Month):  (Does not apply. Denies SI)  Duration (Past 1 Month):  (Does not apply. Denies SI)  Controllability (Past 1 Month):  (Does not apply. Denies SI)  Deterrents (Past 1 Month): Does not apply  Reasons for Ideation (Past 1 Month): Does not apply  Suicidal Behavior (Recent)  Actual Attempt (Past 3 Months): No    Environmental or Psychosocial Events: recent life events (see comment) (physical/medical concerns)  Protective Factors: Protective Factors: strong bond to family unit, community support, or employment,  responsibilities and duties to others, including pets and children, able to access care without barriers, help seeking, optimistic outlook - identification of future goals, lives in a responsibly safe and stable environment, sense of importance of health and wellness    Does the patient have thoughts of harming others? Feels Like Hurting Others: no  Previous Attempt to Hurt Others: no  Is the patient engaging in sexually inappropriate behavior?: no    Is the patient engaging in sexually inappropriate behavior?  no        Mental Status Exam   Affect: Blunted  Appearance: Appropriate  Attention Span/Concentration: Attentive  Eye Contact: Engaged    Fund of Knowledge: Appropriate   Language /Speech Content: Fluent  Language /Speech Volume: Normal  Language /Speech Rate/Productions: Normal  Recent Memory: Intact  Remote Memory: Intact  Mood: Anxious  Orientation to Person: Yes   Orientation to Place: Yes  Orientation to Time of Day: Yes  Orientation to Date: Yes     Situation (Do they understand why they are here?): Yes  Psychomotor Behavior: Normal  Thought Content: Clear  Thought Form: Intact        Medication  Psychotropic medications:   Medication Orders - Psychiatric (From admission, onward)      None             Current Care Team  Patient Care Team:  Nidia Kilgore MD as PCP - General (Family Practice)  Alex Singleton APRN CNM as Assigned OBGYN Provider  Marylu Michel MD as Assigned PCP    Diagnosis  Patient Active Problem List   Diagnosis Code    Menarche [id number]    Nausea without vomiting R11.0    Self-inflicted injury Z72.89    Suicidal ideation R45.851    Major depressive disorder, recurrent episode, moderate (H) F33.1    Left breast lump N63.20    Anxiety F41.9    Major depression F32.9       Primary Problem This Admission  Active Hospital Problems    Anxiety      Major depression        Clinical Summary and Substantiation of Recommendations     Pt presents to the ED with reported concern of  physical ailments and increase in anxiety due to physical health. Pt reports she has been physically unwell for the past few days. Upon arriving to the ED, it was determined Pt tested positive for COVID. When discussing mental health symptoms, Pt reports increase in anxiety symptoms such as poor sleep and change in appetite. Pt denies any SI, HI, AH/VH. Pt reports history of suicide attempts, both while 19 by overdosing or cutting herself. Pt reports she was hospitalized at [facility]. Pt reports she smokes nicotine through a vape. Pt reports she is looking to get connected with a therapist to discuss her anxiety and feels safe to discharge after being medically cleared by the care team. She is not interested in getting started on psychotropic medication.    After mental health crisis assessment, aftercare planning by care team, and consultation with attending provider, Pt's circumstances and mental state were safe for outpatient management. Pt denies any mental health or safety concerns at this time. Close follow-up with a psychiatrist and a therapist was recommended, to which Pt was agreeable to schedule with therapy, yet declined psychiatry. Pt is to return to the ED if any urgent or potentially life-threatening concerns arise.        At the time of discharge, Pt's acute suicide risk was determined to be low due to the following factors: reduction in the intensity of mood/anxiety symptoms that preceded the admission, denial of suicidal thoughts, denies feeling helpless or hopeless, not currently under the influence of alcohol or illicit substances, denies experiencing command hallucinations and no immediate access to firearms. Protective factors include: social support, displays resiliency, future focused thinking, displays insight, and safe/stable housing.        Patient coping skills attempted to reduce the crisis:  Voluntarily presented to the ED.    Disposition  Recommended disposition: Individual Therapy,  Medication Management        Reviewed case and recommendations with attending provider. Attending Name: Dr. Rhodes       Attending concurs with disposition: yes       Patient and/or validated legal guardian concurs with disposition:   yes       Final disposition:  discharge    Legal status on admission: Voluntary/Patient has signed consent for treatment    Assessment Details   Total duration spent with the patient: 27 min     CPT code(s) utilized:      NAREN Bocanegra, Psychotherapist  DEC - Triage & Transition Services  Callback: [phone]

## 2024-08-01 NOTE — ED PROVIDER NOTES
History     Chief Complaint:  Dizziness       HPI   Ayana Hollins is a 23 year old with a history of depression presenting to the emergency department for head pressure intermittently over the last month she states is not painful and it does not seem to be provoked by anything.  She has a little bit of a sore throat but otherwise no fevers no cough no urinary abnormalities.  She was seen at her primary care doctor's office a week ago for the above symptoms had CBC, hCG, TSH and vitamin D checked which were all reassuring.  She did have a tick bite about a month ago which it latched on but did not engorged.  Otherwise went to [place] a month ago as well but no other travel.  She is not on any prescription medication.  She does endorse depression being worse than normal.  She does not see a psychiatrist or therapist.  Her last period was July 2012 that she has not      Independent Historian:    None    Review of External Notes:  I reviewed the office visit from 7/23. Patient was seen by Dr. Lovelace for left upper quadrant abdominal pain, fatigue, depression.  Blood work including CBC, CMP, hCG, TSH, were performed and were normal.  CT for left upper quadrant pain this was scheduled for yesterday but patient got was on.      Medications:    None    Past Medical History:    Anxiety  Depression  Menarche  Self-inflicted injury  Suicidal ideation  Left breast lump       Past Surgical History:    None    Physical Exam   Patient Vitals for the past 24 hrs:   BP Temp Temp src Pulse Resp SpO2   08/01/24 0628 102/60 97.3  F (36.3  C) Temporal 82 16 100 %        Physical Exam    Physical Exam   Constitutional:  Patient is oriented to person, place, and time. They appear well-developed and well-nourished.     HENT:   Mouth/Throat:   Oropharynx is clear and moist.  Enlarged bilateral tonsils slightly erythematous.  Ears TMs are normal bilaterally  Eyes:    Conjunctivae normal and EOM are normal. Pupils are equal,  round, and reactive to light.   Neck:    Normal range of motion.   Cardiovascular: Normal rate, regular rhythm and normal heart sounds.  Exam reveals no gallop and no friction rub.  No murmur heard.  Pulmonary/Chest:  Effort normal and breath sounds normal. Patient has no wheezes. Patient has no rales.   Abdominal:   Soft. Bowel sounds are normal. Patient exhibits no mass. There is no tenderness. There is no rebound and no guarding.   Musculoskeletal:  Normal range of motion. Patient exhibits no edema.   Neurological:   Patient is alert and oriented to person, place, and time. Patient has normal strength. No cranial nerve deficit or sensory deficit. GCS 15  Skin:   Skin is warm and dry. No rash noted. No erythema.   Psychiatric:   Depressed.  Not suicidal.      Emergency Department Course   ECG  ECG results from 08/01/24   EKG 12-lead, tracing only     Value    Systolic Blood Pressure     Diastolic Blood Pressure     Ventricular Rate 69    Atrial Rate 69    NJ Interval 164    QRS Duration 74        QTc 422    P Axis 29    R AXIS 57    T Axis 43    Interpretation ECG      Normal sinus rhythm with sinus arrhythmia  When compared with ECG of 25-Mar-2023 20:35,  No significant change    Read by Meena Rhodes MD at 0745          Imaging:  CT Head w/o Contrast   Final Result   IMPRESSION:   No evidence of acute intracranial hemorrhage, mass, or   herniation.         YURIY JUAREZ MD            SYSTEM ID:  [id number]          Laboratory:  Labs Ordered and Resulted from Time of ED Arrival to Time of ED Departure   INFLUENZA A/B, RSV, & SARS-COV2 PCR - Abnormal       Result Value    Influenza A PCR Negative      Influenza B PCR Negative      RSV PCR Negative      SARS CoV2 PCR Positive (*)    GROUP A STREPTOCOCCUS PCR THROAT SWAB - Normal    Group A strep by PCR Not Detected     LYME DISEASE TOTAL ANTIBODIES WITH REFLEX TO CONFIRMATION        Emergency Department Course &  Assessments:    Interventions:  Medications - No data to display     Assessments:  0712 I obtained patient history and performed a physical exam.   0851 I reassessed patient who will be getting a DEC assessment.  0942 DEC visited patient.     Independent Interpretation (X-rays, CTs, rhythm strip):  CT Head:     Consultations/Discussion of Management or Tests:  0955 I spoke with DEC about patient care plan.        Social Determinants of Health affecting care:  None     Disposition:  The patient was discharged.    Impression & Plan    CMS Diagnoses: None       Medical Decision Making:  Ayana Holilns is a 23-year-old female presenting with head pressure but not pain sore throat listlessness.  Physical exam was very reassuring.  I did review her blood work from a week ago and this is all reassuringly normal do not feel this needed to be repeated.  She was concerned about her head pressure and wanted a CT.  She had no focal neurologic deficits has no meningeal signs but a CT of her head was done and this was fortunately normal.  She is COVID-positive influenza and RSV negative strep negative.  She is otherwise healthy and has had vague symptoms for some time therefore is not qualifying for Paxlovid.  Additionally she is not hypoxic does not have any abnormal vital signs.    I suspect some of her symptoms are due to COVID I suspect other symptoms such as her lack of appetite and lack of energy is in part due to depression.  Because she is COVID-positive I did request a DEC assessment the patient was agreeable to this doctor saw her.  The patient is declining any medication.  She is not in imminent harm to herself or others and at this point a therapy appointment was provided.  She is safe for discharge follow-up with her primary care doctor.        Diagnosis:    ICD-10-CM    1. COVID-19 virus infection  U07.1       2. Other depression  F32.89            Discharge Medications:  New Prescriptions    No medications on  file          Scribe Disclosure:  I, Ines Mercedes, am serving as a scribe at 7:16 AM on 8/1/2024 to document services personally performed by Meena Rhodes MD based on my observations and the provider's statements to me.  8/1/2024   Meena Rhodes MD Bochert, Michelle Ann, MD  08/01/24 1009

## 2024-08-01 NOTE — ED NOTES
Patient states having a lot of pressure on her head lately and throat pain that bothers her.  Looks a bit anxious.

## 2024-08-15 ENCOUNTER — NURSE TRIAGE (OUTPATIENT)
Dept: FAMILY MEDICINE | Facility: CLINIC | Age: 24
End: 2024-08-15
Payer: COMMERCIAL

## 2024-08-15 NOTE — TELEPHONE ENCOUNTER
"S-(situation): Fatigue, swollen gland on neck.     B-(background): Fatigue for several weeks . She was told by ED at last visit her swollen lump on her neck could be a swollen lymph node . She had Covid 8/1/2024.    No fever , sore throat or headache.     A-(assessment): Protocol states to see PCP in two weeks.     R-(recommendations): Appointment in person is scheduled.        Reason for Disposition   Weakness is a chronic symptom (recurrent or ongoing AND present > 4 weeks)    Additional Information   Negative: SEVERE difficulty breathing (e.g., struggling for each breath, speaks in single words)   Negative: Shock suspected (e.g., cold/pale/clammy skin, too weak to stand, low BP, rapid pulse)   Negative: Difficult to awaken or acting confused (e.g., disoriented, slurred speech)   Negative: [1] Fainted > 15 minutes ago AND [2] still feels too weak or dizzy to stand   Negative: [1] SEVERE weakness (i.e., unable to walk or barely able to walk, requires support) AND [2] new-onset or worsening   Negative: Sounds like a life-threatening emergency to the triager   Negative: Weakness of the face, arm or leg on one side of the body   Negative: [1] Diabetes mellitus AND [2] weakness from low blood sugar (i.e., < 60 mg/dl or 3.5 mmol/l)   Negative: Heat exhaustion suspected (i.e., dehydration from heat exposure)   Negative: Chest pain   Negative: Vomiting is main symptom   Negative: Diarrhea is main symptom   Negative: Difficulty breathing   Negative: Heart beating < 50 beats per minute OR > 140 beats per minute   Negative: Extra heartbeats, irregular heart beating, or heart is beating very fast  (i.e., \"palpitations\")   Negative: Follows large amount of bleeding (e.g., from vomiting, rectum, vagina  (Exception: Small brief weakness from sight of a small amount blood.)   Negative: Black or tarry bowel movements   Negative: [1] Drinking very little AND [2] dehydration suspected (e.g., no urine > 12 hours, very dry mouth, very " "lightheaded)   Negative: Patient sounds very sick or weak to the triager   Negative: [1] MODERATE weakness (i.e., interferes with work, school, normal activities) AND [2] cause unknown  (Exceptions: Weakness from acute minor illness or poor fluid intake; weakness is chronic and not worse.)   Negative: [1] MODERATE weakness or fatigue AND [2] from poor fluid intake AND [3] no improvement after 2 hours of rest and fluids   Negative: [1] Fever > 103 F (39.4 C) AND [2] not able to get the fever down using Fever Care Advice   Negative: [1] Fever > 101 F (38.3 C) AND [2] age > 60 years   Negative: [1] Fever > 100.0 F (37.8 C) AND [2] bedridden (e.g., CVA, chronic illness, recovering from surgery)   Negative: [1] Fever > 100.0 F (37.8 C) AND [2] diabetes mellitus or weak immune system (e.g., HIV positive, cancer chemo, splenectomy, organ transplant, chronic steroids)   Negative: Pale skin (pallor)   Negative: [1] MODERATE weakness (i.e., interferes with work, school, normal activities) AND [2] persists > 3 days   Negative: Taking a medicine that could cause weakness (e.g., blood pressure medications, diuretics)   Negative: [1] MILD weakness (i.e., does not interfere with ability to work, go to school, normal activities) AND [2] persists > 1 week   Negative: [1] Fatigue (i.e., tires easily, decreased energy) AND [2] persists > 1 week    Answer Assessment - Initial Assessment Questions  1. DESCRIPTION: \"Describe how you are feeling.\"      Very fatigue  2. SEVERITY: \"How bad is it?\"  \"Can you stand and walk?\"    - MILD (0-3): Feels weak or tired, but does not interfere with work, school or normal activities.    - MODERATE (4-7): Able to stand and walk; weakness interferes with work, school, or normal activities.    - SEVERE (8-10): Unable to stand or walk; unable to do usual activities.      Moderate.   3. ONSET: \"When did these symptoms begin?\" (e.g., hours, days, weeks, months)      Weeks  4. CAUSE: \"What do you think is " "causing the weakness or fatigue?\" (e.g., not drinking enough fluids, medical problem, trouble sleeping)      Not sure  5. NEW MEDICINES:  \"Have you started on any new medicines recently?\" (e.g., opioid pain medicines, benzodiazepines, muscle relaxants, antidepressants, antihistamines, neuroleptics, beta blockers)      No  6. OTHER SYMPTOMS: \"Do you have any other symptoms?\" (e.g., chest pain, fever, cough, SOB, vomiting, diarrhea, bleeding, other areas of pain)      No  7. PREGNANCY: \"Is there any chance you are pregnant?\" \"When was your last menstrual period?\"      N/A    Protocols used: Weakness (Generalized) and Fatigue-BERNARDO Elizabeth RN  -[facility]     "

## 2024-08-15 NOTE — TELEPHONE ENCOUNTER
Reason for Call:  Appointment Request    Patient requesting this type of appt:  Office Visit    Requested provider: Nidia Kilgore    Reason patient unable to be scheduled: Not within requested timeframe    When does patient want to be seen/preferred time: 3-7 days    Comments: Weak and tire feeling, ringing in ears, lump on neck . Requesting to be worked in sooner to see Dr. Kilgore    Could we send this information to you in [facility] or would you prefer to receive a phone call?:   Patient would prefer a phone call   Okay to leave a detailed message?: Yes at Home number on file [phone] (home)    Call taken on 8/15/2024 at 1:19 PM by Anitha French

## 2024-08-20 ENCOUNTER — OFFICE VISIT (OUTPATIENT)
Dept: FAMILY MEDICINE | Facility: CLINIC | Age: 24
End: 2024-08-20
Payer: COMMERCIAL

## 2024-08-20 VITALS
RESPIRATION RATE: 16 BRPM | BODY MASS INDEX: 19.07 KG/M2 | HEIGHT: 61 IN | TEMPERATURE: 98.2 F | HEART RATE: 80 BPM | SYSTOLIC BLOOD PRESSURE: 97 MMHG | WEIGHT: 101 LBS | OXYGEN SATURATION: 98 % | DIASTOLIC BLOOD PRESSURE: 67 MMHG

## 2024-08-20 DIAGNOSIS — R53.83 FATIGUE, UNSPECIFIED TYPE: Primary | ICD-10-CM

## 2024-08-20 DIAGNOSIS — H93.12 TINNITUS, LEFT: ICD-10-CM

## 2024-08-20 DIAGNOSIS — R00.2 PALPITATIONS: ICD-10-CM

## 2024-08-20 LAB — FOLATE SERPL-MCNC: 19.6 NG/ML (ref 4.6–34.8)

## 2024-08-20 PROCEDURE — 82607 VITAMIN B-12: CPT | Performed by: FAMILY MEDICINE

## 2024-08-20 PROCEDURE — 36415 COLL VENOUS BLD VENIPUNCTURE: CPT | Performed by: FAMILY MEDICINE

## 2024-08-20 PROCEDURE — 82746 ASSAY OF FOLIC ACID SERUM: CPT | Performed by: FAMILY MEDICINE

## 2024-08-20 PROCEDURE — 82728 ASSAY OF FERRITIN: CPT | Performed by: FAMILY MEDICINE

## 2024-08-20 PROCEDURE — G2211 COMPLEX E/M VISIT ADD ON: HCPCS | Performed by: FAMILY MEDICINE

## 2024-08-20 PROCEDURE — 99214 OFFICE O/P EST MOD 30 MIN: CPT | Performed by: FAMILY MEDICINE

## 2024-08-20 ASSESSMENT — PAIN SCALES - GENERAL: PAINLEVEL: NO PAIN (0)

## 2024-08-20 NOTE — PROGRESS NOTES
Assessment & Plan     Fatigue, unspecified type  Questionable etiology.  I suspect that she is not eating enough calories.  She does not eat breakfast and lunch as she does not get a break at work.  Recommending to always start her day with breakfast and keep meal replacement bar or something that she can easily munch on at work which she is able to do.  Increase hydration.  Eat a good meal in the evening as well.  I did ask her if she needed a note to allow her time to eat lunch at work but she does not think that that will matter.  Labs ordered as below   - Folate; Future  - Vitamin B12; Future  - Ferritin; Future  - Folate  - Vitamin B12  - Ferritin    Palpitations  Palpitations reported by the patient.  Recommending Holter monitoring for 48 hours  - Adult Holter Monitor 48 hour; Future    Tinnitus, left  Referring the patient to ENT for tinnitus evaluation  - Adult ENT  Referral; Future      The longitudinal plan of care for the diagnosis(es)/condition(s) as documented were addressed during this visit. Due to the added complexity in care, I will continue to support Ayana in the subsequent management and with ongoing continuity of care.            Subjective   Ayana is a 24 year old, presenting for the following health issues:  Tinnitus, Fatigue, and Smoking Cessation (Patient wants help to stop vaping)        8/20/2024    12:56 PM   Additional Questions   Roomed by Benjamin GALVAN     History of Present Illness       Reason for visit:  Rash on my lip, ringing ears, neck tighness, weak pulse, feeling faint/delusional, pressure in ears/head  Symptom onset:  More than a month  Symptoms include:  Sometimes me left arm feels weak  Symptom intensity:  Moderate  Symptom progression:  Worsening  Had these symptoms before:  No  What makes it worse:  No  What makes it better:  No    She eats 2-3 servings of fruits and vegetables daily.She consumes 2 sweetened beverage(s) daily.She exercises with enough effort  "to increase her heart rate 30 to 60 minutes per day.  She exercises with enough effort to increase her heart rate 3 or less days per week.   She is taking medications regularly.                 Review of Systems  CONSTITUTIONAL: NEGATIVE for fever, chills, change in weight  ENT/MOUTH: NEGATIVE for ear, mouth and throat problems  RESP: NEGATIVE for significant cough or SOB  CV: NEGATIVE for chest pain, palpitations or peripheral edema      Objective    BP 97/67   Pulse 80   Temp 98.2  F (36.8  C) (Tympanic)   Resp 16   Ht 1.54 m (5' 0.63\")   Wt 45.8 kg (101 lb)   LMP 08/12/2024 (Exact Date)   SpO2 98%   BMI 19.32 kg/m    Body mass index is 19.32 kg/m .  Physical Exam   GENERAL: alert and no distress  NECK: no adenopathy, no asymmetry, masses, or scars  RESP: lungs clear to auscultation - no rales, rhonchi or wheezes  CV: regular rate and rhythm, normal S1 S2, no S3 or S4, no murmur, click or rub, no peripheral edema  ABDOMEN: soft, nontender, no hepatosplenomegaly, no masses and bowel sounds normal  MS: no gross musculoskeletal defects noted, no edema            Signed Electronically by: Mike Silva MD    "

## 2024-08-21 LAB
FERRITIN SERPL-MCNC: 47 NG/ML (ref 6–175)
VIT B12 SERPL-MCNC: 491 PG/ML (ref 232–1245)

## 2024-08-27 ENCOUNTER — E-VISIT (OUTPATIENT)
Dept: FAMILY MEDICINE | Facility: CLINIC | Age: 24
End: 2024-08-27
Payer: COMMERCIAL

## 2024-08-27 DIAGNOSIS — R21 RASH AND NONSPECIFIC SKIN ERUPTION: Primary | ICD-10-CM

## 2024-08-27 PROCEDURE — 99421 OL DIG E/M SVC 5-10 MIN: CPT | Performed by: FAMILY MEDICINE

## 2024-09-07 ENCOUNTER — HEALTH MAINTENANCE LETTER (OUTPATIENT)
Age: 24
End: 2024-09-07

## 2024-10-23 ENCOUNTER — TELEPHONE (OUTPATIENT)
Dept: FAMILY MEDICINE | Facility: CLINIC | Age: 24
End: 2024-10-23
Payer: COMMERCIAL

## 2024-10-23 NOTE — TELEPHONE ENCOUNTER
1st attempt- Left voicemail for the patient to call back and schedule the following:    Appointment type:  Testing only- No clinic visit Holter  Provider:  any    Return date:  10/30/24  Additional appointment(s) needed:  n/a  Additonal Notes:    Specialty phone number: [phone]

## 2024-11-05 ENCOUNTER — E-VISIT (OUTPATIENT)
Dept: FAMILY MEDICINE | Facility: CLINIC | Age: 24
End: 2024-11-05
Payer: COMMERCIAL

## 2024-11-05 DIAGNOSIS — Z71.6 ENCOUNTER FOR SMOKING CESSATION COUNSELING: Primary | ICD-10-CM

## 2024-11-05 DIAGNOSIS — F39 MOOD DISORDER (H): ICD-10-CM

## 2024-11-05 PROCEDURE — 99421 OL DIG E/M SVC 5-10 MIN: CPT | Performed by: FAMILY MEDICINE

## 2024-11-05 RX ORDER — BUPROPION HYDROCHLORIDE 150 MG/1
150 TABLET, EXTENDED RELEASE ORAL 2 TIMES DAILY
Qty: 60 TABLET | Refills: 3 | Status: SHIPPED | OUTPATIENT
Start: 2024-11-05 | End: 2024-11-06

## 2024-11-05 ASSESSMENT — ANXIETY QUESTIONNAIRES
5. BEING SO RESTLESS THAT IT IS HARD TO SIT STILL: NEARLY EVERY DAY
GAD7 TOTAL SCORE: 20
7. FEELING AFRAID AS IF SOMETHING AWFUL MIGHT HAPPEN: NEARLY EVERY DAY
GAD7 TOTAL SCORE: 20
6. BECOMING EASILY ANNOYED OR IRRITABLE: MORE THAN HALF THE DAYS
8. IF YOU CHECKED OFF ANY PROBLEMS, HOW DIFFICULT HAVE THESE MADE IT FOR YOU TO DO YOUR WORK, TAKE CARE OF THINGS AT HOME, OR GET ALONG WITH OTHER PEOPLE?: EXTREMELY DIFFICULT
IF YOU CHECKED OFF ANY PROBLEMS ON THIS QUESTIONNAIRE, HOW DIFFICULT HAVE THESE PROBLEMS MADE IT FOR YOU TO DO YOUR WORK, TAKE CARE OF THINGS AT HOME, OR GET ALONG WITH OTHER PEOPLE: EXTREMELY DIFFICULT
1. FEELING NERVOUS, ANXIOUS, OR ON EDGE: NEARLY EVERY DAY
7. FEELING AFRAID AS IF SOMETHING AWFUL MIGHT HAPPEN: NEARLY EVERY DAY
3. WORRYING TOO MUCH ABOUT DIFFERENT THINGS: NEARLY EVERY DAY
2. NOT BEING ABLE TO STOP OR CONTROL WORRYING: NEARLY EVERY DAY
GAD7 TOTAL SCORE: 20
4. TROUBLE RELAXING: NEARLY EVERY DAY

## 2024-11-05 ASSESSMENT — PATIENT HEALTH QUESTIONNAIRE - PHQ9
SUM OF ALL RESPONSES TO PHQ QUESTIONS 1-9: 19
SUM OF ALL RESPONSES TO PHQ QUESTIONS 1-9: 19
10. IF YOU CHECKED OFF ANY PROBLEMS, HOW DIFFICULT HAVE THESE PROBLEMS MADE IT FOR YOU TO DO YOUR WORK, TAKE CARE OF THINGS AT HOME, OR GET ALONG WITH OTHER PEOPLE: VERY DIFFICULT

## 2024-11-06 RX ORDER — NICOTINE 21 MG/24HR
1 PATCH, TRANSDERMAL 24 HOURS TRANSDERMAL EVERY 24 HOURS
Qty: 30 PATCH | Refills: 0 | Status: SHIPPED | OUTPATIENT
Start: 2024-12-13 | End: 2025-01-12

## 2024-11-06 RX ORDER — NICOTINE 21 MG/24HR
1 PATCH, TRANSDERMAL 24 HOURS TRANSDERMAL EVERY 24 HOURS
Qty: 30 PATCH | Refills: 0 | Status: SHIPPED | OUTPATIENT
Start: 2024-11-06 | End: 2024-12-06

## 2024-11-06 ASSESSMENT — PATIENT HEALTH QUESTIONNAIRE - PHQ9: SUM OF ALL RESPONSES TO PHQ QUESTIONS 1-9: 19

## 2025-02-19 ENCOUNTER — HOSPITAL ENCOUNTER (OUTPATIENT)
Dept: CARDIOLOGY | Facility: CLINIC | Age: 25
Discharge: HOME | End: 2025-02-19
Attending: FAMILY MEDICINE
Payer: COMMERCIAL

## 2025-02-19 DIAGNOSIS — R00.2 PALPITATIONS: ICD-10-CM

## 2025-02-19 PROCEDURE — 93226 XTRNL ECG REC<48 HR SCAN A/R: CPT

## 2025-02-27 DIAGNOSIS — R00.2 PALPITATIONS: Primary | ICD-10-CM

## 2025-03-03 ENCOUNTER — OFFICE VISIT (OUTPATIENT)
Dept: FAMILY MEDICINE | Facility: CLINIC | Age: 25
End: 2025-03-03
Payer: COMMERCIAL

## 2025-03-03 VITALS
SYSTOLIC BLOOD PRESSURE: 84 MMHG | OXYGEN SATURATION: 96 % | HEART RATE: 80 BPM | RESPIRATION RATE: 18 BRPM | WEIGHT: 106.1 LBS | HEIGHT: 59 IN | TEMPERATURE: 97.1 F | DIASTOLIC BLOOD PRESSURE: 64 MMHG | BODY MASS INDEX: 21.39 KG/M2

## 2025-03-03 DIAGNOSIS — R22.1 LUMP ON NECK: Primary | ICD-10-CM

## 2025-03-03 DIAGNOSIS — F33.1 MAJOR DEPRESSIVE DISORDER, RECURRENT EPISODE, MODERATE (H): ICD-10-CM

## 2025-03-03 LAB
ALBUMIN SERPL BCG-MCNC: 4.5 G/DL (ref 3.5–5.2)
ALP SERPL-CCNC: 61 U/L (ref 40–150)
ALT SERPL W P-5'-P-CCNC: 16 U/L (ref 0–50)
ANION GAP SERPL CALCULATED.3IONS-SCNC: 10 MMOL/L (ref 7–15)
AST SERPL W P-5'-P-CCNC: 26 U/L (ref 0–45)
BILIRUB SERPL-MCNC: 0.3 MG/DL
BUN SERPL-MCNC: 13.3 MG/DL (ref 6–20)
CALCIUM SERPL-MCNC: 10.1 MG/DL (ref 8.8–10.4)
CHLORIDE SERPL-SCNC: 103 MMOL/L (ref 98–107)
CREAT SERPL-MCNC: 0.62 MG/DL (ref 0.51–0.95)
EGFRCR SERPLBLD CKD-EPI 2021: >90 ML/MIN/1.73M2
ERYTHROCYTE [DISTWIDTH] IN BLOOD BY AUTOMATED COUNT: 11.2 % (ref 10–15)
GLUCOSE SERPL-MCNC: 71 MG/DL (ref 70–99)
HCO3 SERPL-SCNC: 26 MMOL/L (ref 22–29)
HCT VFR BLD AUTO: 40.7 % (ref 35–47)
HGB BLD-MCNC: 13.7 G/DL (ref 11.7–15.7)
MCH RBC QN AUTO: 28.9 PG (ref 26.5–33)
MCHC RBC AUTO-ENTMCNC: 33.7 G/DL (ref 31.5–36.5)
MCV RBC AUTO: 86 FL (ref 78–100)
PLATELET # BLD AUTO: 296 10E3/UL (ref 150–450)
POTASSIUM SERPL-SCNC: 4.2 MMOL/L (ref 3.4–5.3)
PROT SERPL-MCNC: 7.8 G/DL (ref 6.4–8.3)
RBC # BLD AUTO: 4.74 10E6/UL (ref 3.8–5.2)
SODIUM SERPL-SCNC: 139 MMOL/L (ref 135–145)
TSH SERPL DL<=0.005 MIU/L-ACNC: 0.98 UIU/ML (ref 0.3–4.2)
WBC # BLD AUTO: 6.2 10E3/UL (ref 4–11)

## 2025-03-03 PROCEDURE — 84443 ASSAY THYROID STIM HORMONE: CPT | Performed by: FAMILY MEDICINE

## 2025-03-03 PROCEDURE — 3078F DIAST BP <80 MM HG: CPT | Performed by: FAMILY MEDICINE

## 2025-03-03 PROCEDURE — 99214 OFFICE O/P EST MOD 30 MIN: CPT | Performed by: FAMILY MEDICINE

## 2025-03-03 PROCEDURE — 1125F AMNT PAIN NOTED PAIN PRSNT: CPT | Performed by: FAMILY MEDICINE

## 2025-03-03 PROCEDURE — 36415 COLL VENOUS BLD VENIPUNCTURE: CPT | Performed by: FAMILY MEDICINE

## 2025-03-03 PROCEDURE — 3074F SYST BP LT 130 MM HG: CPT | Performed by: FAMILY MEDICINE

## 2025-03-03 PROCEDURE — 80053 COMPREHEN METABOLIC PANEL: CPT | Performed by: FAMILY MEDICINE

## 2025-03-03 PROCEDURE — 85027 COMPLETE CBC AUTOMATED: CPT | Performed by: FAMILY MEDICINE

## 2025-03-03 ASSESSMENT — PATIENT HEALTH QUESTIONNAIRE - PHQ9
SUM OF ALL RESPONSES TO PHQ QUESTIONS 1-9: 10
10. IF YOU CHECKED OFF ANY PROBLEMS, HOW DIFFICULT HAVE THESE PROBLEMS MADE IT FOR YOU TO DO YOUR WORK, TAKE CARE OF THINGS AT HOME, OR GET ALONG WITH OTHER PEOPLE: NOT DIFFICULT AT ALL
SUM OF ALL RESPONSES TO PHQ QUESTIONS 1-9: 10

## 2025-03-03 ASSESSMENT — PAIN SCALES - GENERAL: PAINLEVEL_OUTOF10: MODERATE PAIN (6)

## 2025-03-03 NOTE — PROGRESS NOTES
"  Assessment & Plan     Lump on neck  Patient has slightly tender lump on the right side of her thyroid gland.  Patient has noticed this about 3 weeks ago.  Tenderness recently started in the last week or so.  Recommending to proceed with a thyroid ultrasound and labs as ordered below.  Await the test results.  Use ibuprofen or Tylenol for discomfort.  Patient agrees to the plan  - US Thyroid; Future  - TSH with free T4 reflex; Future  - CBC with platelets; Future  - Comprehensive metabolic panel; Future    Major depressive disorder, recurrent episode, moderate (H)    PHQ-9 scores are elevated but the patient declined treatment with medications or therapy.  She tells that she is not at risk for suicide.              Atul Piña is a 24 year old, presenting for the following health issues:  Mass (In throat) and Fatigue (For a couple months not eating makes it worse)        3/3/2025    11:21 AM   Additional Questions   Roomed by Grayson Whiting    History of Present Illness       Reason for visit:  Lump in throat  Symptom onset:  3-4 weeks ago   She is taking medications regularly.            Patient tells that she had flulike symptoms about 4 weeks ago.  About 3 weeks ago she noticed a lump on the right side of her neck.  She could feel that with swallowing.  About a week and a half ago she started noticing discomfort with swallowing as well as tenderness on palpation.  She is not able to feel the lump on the surface.        Review of Systems  CONSTITUTIONAL: NEGATIVE for fever, chills, change in weight  ENT/MOUTH: NEGATIVE for ear, mouth and throat problems  RESP: NEGATIVE for significant cough or SOB  CV: NEGATIVE for chest pain, palpitations or peripheral edema      Objective    BP (!) 84/64 (BP Location: Left arm, Patient Position: Sitting, Cuff Size: Adult Regular)   Pulse 80   Temp 97.1  F (36.2  C) (Temporal)   Resp 18   Ht 1.5 m (4' 11.06\")   Wt 48.1 kg (106 lb 1.6 oz)   LMP 02/19/2025 " (Exact Date)   SpO2 96%   Breastfeeding No   BMI 21.39 kg/m    Body mass index is 21.39 kg/m .  Physical Exam   GENERAL: alert and no distress  HENT: ear canals and TM's normal, nose and mouth without ulcers or lesions  NECK: On palpation there is a mildly tender lump felt on the right side of the thyroid gland.  RESP: lungs clear to auscultation - no rales, rhonchi or wheezes  CV: regular rate and rhythm, normal S1 S2, no S3 or S4, no murmur, click or rub, no peripheral edema  ABDOMEN: soft, nontender, no hepatosplenomegaly, no masses and bowel sounds normal            Signed Electronically by: Mike Silva MD

## 2025-03-04 ENCOUNTER — MYC MEDICAL ADVICE (OUTPATIENT)
Dept: FAMILY MEDICINE | Facility: CLINIC | Age: 25
End: 2025-03-04
Payer: COMMERCIAL

## 2025-03-04 ENCOUNTER — ANCILLARY PROCEDURE (OUTPATIENT)
Dept: ULTRASOUND IMAGING | Facility: CLINIC | Age: 25
End: 2025-03-04
Attending: FAMILY MEDICINE
Payer: COMMERCIAL

## 2025-03-04 DIAGNOSIS — R22.1 LUMP ON NECK: Primary | ICD-10-CM

## 2025-03-04 DIAGNOSIS — R22.1 LUMP ON NECK: ICD-10-CM

## 2025-03-04 PROCEDURE — 76536 US EXAM OF HEAD AND NECK: CPT

## 2025-03-05 NOTE — TELEPHONE ENCOUNTER
Pt was seen on 3/3, and thyroid U/S completed yesterday.  Routing for provider to review new note, and advise pt of results.  Sasha Cowan, RN  [facility] RN Triage Team

## 2025-03-05 NOTE — TELEPHONE ENCOUNTER
Thyroid ultrasound is normal.  No suspicious findings noted in the thyroid gland itself.  There is possibly a small parathyroid gland enlargement/adenoma.  I would like the patient to follow-up at the lab for a parathyroid hormone level.  There is a slightly enlarged  Lymph node noted and it appears to be reactive in nature which means it is caused by some inflammation.  It  does not have any concerning features.      I would recommend that she takes a combination of ibuprofen 800 mg with Tylenol 500mg together 3 times a day.  Use the medications with food and stay well-hydrated.    If she notices increase in the size of swelling, she needs to go to the emergency room for evaluation.    Mike Silva MD  [facility] [place]

## 2025-03-05 NOTE — TELEPHONE ENCOUNTER
Pt scheduled for lab appointment 3/13/25 at 11:45am, [facility].      When I asked about orders to be placed for a lab appointment the pt replied with the following:    Yes! Dr. Silva said in a message  I would like the patient to follow-up at the lab for a parathyroid hormone level.  I was able to schedule a lab appointment and the person I spoke to with on the phone said to let my doctor know that I scheduled a lab appointment so that she can send the lab what kind of test I was going to be doing?     Orders will need to be placed.     Ana María Byrne on 3/5/2025 at 5:54 PM

## 2025-03-13 ENCOUNTER — LAB (OUTPATIENT)
Dept: LAB | Facility: CLINIC | Age: 25
End: 2025-03-13
Payer: COMMERCIAL

## 2025-03-13 DIAGNOSIS — R22.1 LUMP ON NECK: ICD-10-CM

## 2025-03-31 ENCOUNTER — ANCILLARY PROCEDURE (OUTPATIENT)
Dept: GENERAL RADIOLOGY | Facility: CLINIC | Age: 25
End: 2025-03-31
Attending: PHYSICIAN ASSISTANT
Payer: COMMERCIAL

## 2025-03-31 ENCOUNTER — OFFICE VISIT (OUTPATIENT)
Dept: FAMILY MEDICINE | Facility: CLINIC | Age: 25
End: 2025-03-31
Payer: COMMERCIAL

## 2025-03-31 ENCOUNTER — NURSE TRIAGE (OUTPATIENT)
Dept: FAMILY MEDICINE | Facility: CLINIC | Age: 25
End: 2025-03-31

## 2025-03-31 VITALS
OXYGEN SATURATION: 100 % | HEART RATE: 100 BPM | BODY MASS INDEX: 21.37 KG/M2 | SYSTOLIC BLOOD PRESSURE: 110 MMHG | DIASTOLIC BLOOD PRESSURE: 74 MMHG | TEMPERATURE: 96.9 F | HEIGHT: 59 IN | WEIGHT: 106 LBS

## 2025-03-31 DIAGNOSIS — R09.1 PLEURISY WITHOUT EFFUSION: Primary | ICD-10-CM

## 2025-03-31 DIAGNOSIS — R07.81 PLEURITIC CHEST PAIN: ICD-10-CM

## 2025-03-31 DIAGNOSIS — R00.2 PALPITATIONS: ICD-10-CM

## 2025-03-31 PROCEDURE — 3074F SYST BP LT 130 MM HG: CPT | Performed by: PHYSICIAN ASSISTANT

## 2025-03-31 PROCEDURE — 99214 OFFICE O/P EST MOD 30 MIN: CPT | Performed by: PHYSICIAN ASSISTANT

## 2025-03-31 PROCEDURE — 3078F DIAST BP <80 MM HG: CPT | Performed by: PHYSICIAN ASSISTANT

## 2025-03-31 PROCEDURE — 1126F AMNT PAIN NOTED NONE PRSNT: CPT | Performed by: PHYSICIAN ASSISTANT

## 2025-03-31 PROCEDURE — 71046 X-RAY EXAM CHEST 2 VIEWS: CPT | Mod: TC | Performed by: RADIOLOGY

## 2025-03-31 RX ORDER — NAPROXEN 500 MG/1
500 TABLET ORAL 2 TIMES DAILY WITH MEALS
Qty: 28 TABLET | Refills: 0 | Status: SHIPPED | OUTPATIENT
Start: 2025-03-31 | End: 2025-04-14

## 2025-03-31 ASSESSMENT — PAIN SCALES - GENERAL: PAINLEVEL_OUTOF10: NO PAIN (0)

## 2025-03-31 NOTE — TELEPHONE ENCOUNTER
"Dry throat chest pain hurts to breathe in no cough. Back feels tender. Stings chest pain    Hurts to breathe in           Reason for Disposition   Patient wants to be seen    Additional Information   Negative: SEVERE difficulty breathing (e.g., struggling for each breath, speaks in single words, pulse > 120)   Negative: Breathing stopped and hasn't returned   Negative: Choking on something   Negative: Bluish (or gray) lips or face   Negative: Difficult to awaken or acting confused (e.g., disoriented, slurred speech)   Negative: Passed out (e.g., fainted, lost consciousness, blacked out and was not responding)   Negative: Wheezing started suddenly after medicine, an allergic food, or bee sting   Negative: Stridor (harsh sound while breathing in)   Negative: Slow, shallow and weak breathing   Negative: Sounds like a life-threatening emergency to the triager   Negative: Chest pain   Negative: Wheezing (high pitched whistling sound) and previous asthma attacks or use of asthma medicines   Negative: Breathing difficulty and within 14 days of COVID-19 EXPOSURE (close contact) with someone diagnosed with COVID-19 (e.g., COVID test positive)   Negative: Breathing difficulty and COVID-19 is widespread in the community   Negative: Breathing diffculty and only present when coughing   Negative: Breathing difficulty and only from stuffy nose   Negative: Breathing diffculty and only from stuffy nose or runny nose from common cold   Negative: MODERATE difficulty breathing (e.g., speaks in phrases, SOB even at rest, pulse 100-120) of new-onset or worse than normal   Negative: Oxygen level (e.g., pulse oximetry) 90% or lower   Negative: Wheezing can be heard across the room   Negative: Drooling or spitting out saliva (because can't swallow)   Negative: Any history of prior \"blood clot\" in leg or lungs   Negative: Illness requiring prolonged bedrest in past month (e.g., immobilization, long hospital stay)   Negative: Hip or leg " "fracture (broken bone) in past month (or had cast on leg or ankle in past month)   Negative: Major surgery in the past month   Negative: Long-distance travel in past month (e.g., car, bus, train, plane; with trip lasting 6 or more hours)   Negative: Cancer treatment in past six months (or has cancer now)   Negative: Extra heartbeats, irregular heart beating, or heart is beating very fast (i.e., 'palpitations')   Negative: Fever > 103 F (39.4 C)   Negative: Fever > 101 F (38.3 C) and over 60 years of age   Negative: Fever > 100 F (37.8 C) and bedridden (e.g., nursing home patient, stroke, chronic illness, recovering from surgery)   Negative: Fever > 100 F (37.8 C) and diabetes mellitus or weak immune system (e.g., HIV positive, cancer chemo, splenectomy, organ transplant, chronic steroids)   Negative: Periods where breathing stops and then resumes normally and bedridden (e.g., nursing home patient, CVA)   Negative: Pregnant or postpartum (from 0 to 6 weeks after delivery)   Negative: Patient sounds very sick or weak to the triager    Answer Assessment - Initial Assessment Questions  1. RESPIRATORY STATUS: \"Describe your breathing?\" (e.g., wheezing, shortness of breath, unable to speak, severe coughing)      Hurts with deep breath  2. ONSET: \"When did this breathing problem begin?\"       This morning  3. PATTERN \"Does the difficult breathing come and go, or has it been constant since it started?\"       Lungs feel really bad with breathing  4. SEVERITY: \"How bad is your breathing?\" (e.g., mild, moderate, severe)       mild  5. RECURRENT SYMPTOM: \"Have you had difficulty breathing before?\" If Yes, ask: \"When was the last time?\" and \"What happened that time?\"       never  6. CARDIAC HISTORY: \"Do you have any history of heart disease?\" (e.g., heart attack, angina, bypass surgery, angioplasty)       None-quit smoking 6 smoking  7. LUNG HISTORY: \"Do you have any history of lung disease?\"  (e.g., pulmonary embolus, asthma, " "emphysema)      none  8. CAUSE: \"What do you think is causing the breathing problem?\"       unknown  9. OTHER SYMPTOMS: \"Do you have any other symptoms?\" (e.g., chest pain, cough, dizziness, fever, runny nose)      Stuffy nose when waking up  10. O2 SATURATION MONITOR:  \"Do you use an oxygen saturation monitor (pulse oximeter) at home?\" If Yes, ask: \"What is your reading (oxygen level) today?\" \"What is your usual oxygen saturation reading?\" (e.g., 95%)        no  11. PREGNANCY: \"Is there any chance you are pregnant?\" \"When was your last menstrual period?\"        none  12. TRAVEL: \"Have you traveled out of the country in the last month?\" (e.g., travel history, exposures)        none    Protocols used: Breathing Difficulty-A-[place]    "

## 2025-03-31 NOTE — PROGRESS NOTES
Assessment & Plan     ICD-10-CM    1. Pleurisy without effusion  R09.1 XR Chest 2 Views     naproxen (NAPROSYN) 500 MG tablet      2. Palpitations  R00.2           Pleurisy:  - Likely viral in nature given associated exam findings.  - Prescribe Naprosyn to be taken twice daily for up to 14 days prn pain.  - May use Tylenol prn pain but do not use aspirin or ibuprofen when on naprosyn.  - Recommend using a heating pad or ice pack for comfort. - - Activity as tolerated.  - Discussed symptoms that warrant recheck.    Palpitations  - Mildly tachycardic on exam, likely active due to infection. Will likely improve with symptomatic treatment of infectious symptoms.  - follow-up with cardiology as scheduled.    Consent was obtained from the patient to use an AI documentation tool in the creation of this note.    Atul Piña is a 24 year old, presenting for the following health issues:  Breathing Problem    History of Present Illness       Reason for visit:  Hurts to breathe  Symptom onset:  3-7 days ago  Symptoms include:  Hurts to breathe  Symptom intensity:  Mild  Symptom progression:  Worsening  Had these symptoms before:  No  What makes it worse:  No  What makes it better:  No   She is taking medications regularly.   Ayana Hollins, a 24-year-old female, reported experiencing pain with breathing, which began this morning upon waking. She described a heavy feeling in her chest, accompanied by feelings of delirium and nausea. For the past four days, she has had a very dry throat that did not improve with tea, water, or honey, which she attributed to allergies. She does not have a cough. Ayana has experienced similar lung pain in the past when having a bad cough, but currently, there is no cough present. She feels weak. History of anxiety, which sometimes causes her heart to beat fast, even when lying down. She has previously taken anxiety medication but stopped due to dependency concerns. She will be  "seeing cardiology soon due to ongoing palpitations. Ayana has no history of asthma, chronic lung conditions, or blood clots. She denies recent travel or periods of inactivity          Review of Systems  Constitutional, HEENT, cardiovascular, pulmonary, GI, , musculoskeletal, neuro, skin, endocrine and psych systems are negative, except as otherwise noted.      Objective    /74 (BP Location: Right arm, Patient Position: Sitting, Cuff Size: Adult Small)   Pulse 100   Temp 96.9  F (36.1  C) (Oral)   Ht 1.499 m (4' 11\")   Wt 48.1 kg (106 lb)   LMP 02/19/2025 (Exact Date)   SpO2 100%   BMI 21.41 kg/m    Body mass index is 21.41 kg/m .  Physical Exam   GENERAL:  WDWN, no acute distress  PSYCH: pleasant, cooperative  EYES: no discharge, no injection  HENT:  Normocephalic. Moist mucus membranes. Oropharynx with clear PND, pink, uvula midline, no tonsillar hypertrophy. Nasal mucosa edematous, erythematous, clear rhinorrhea.  NECK:  Supple, symmetric, shotty TORIBIO bilat.  LUNGS:  Clear to auscultation bilaterally without rhonchi, rales, or wheeze. Chest rise symmetric and no tenderness to palpation.  HEART:  Regular rate & rhythm. No murmur, gallop, or rub.  ABDOMEN:  Soft, non-tender, non-distended. Bowel sounds are present. No palpable masses  EXTREMITIES:  No gross deformities, moves all 4 limbs spontaneously, no peripheral edema  SKIN:  Warm and dry, no rash or suspicious lesions    NEUROLOGIC:  Alert, sensation grossly intact.    CXR - Reviewed and interpreted by me Normal- no infiltrates, effusions, pneumothoraces, cardiomegaly or masses; awaiting formal interpretation from Radiologist at this time        Signed Electronically by: Teressa Alaniz PA-C    "

## 2025-04-13 ENCOUNTER — E-VISIT (OUTPATIENT)
Dept: URGENT CARE | Facility: CLINIC | Age: 25
End: 2025-04-13
Payer: COMMERCIAL

## 2025-04-13 DIAGNOSIS — R19.7 DIARRHEA, UNSPECIFIED TYPE: Primary | ICD-10-CM

## 2025-04-13 PROCEDURE — 99207 PR NON-BILLABLE SERV PER CHARTING: CPT | Performed by: INTERNAL MEDICINE

## 2025-04-13 NOTE — PATIENT INSTRUCTIONS
"Dear Ayana Hollins,    We are sorry you are not feeling well. Based on the responses you provided, it is recommended that you be seen in-person in urgent care so we can better evaluate your symptoms to determine the cause of your symptoms. Please click here to find the nearest urgent care location to you. You can often check in online for urgent care using the \"tell us you're on the way\" feature to reduce your time in clinic.   You will not be charged for this Visit. Thank you for trusting us with your care.    Any Elam MD    Diarrhea: Care Instructions  Overview     Diarrhea is loose, watery stools (bowel movements). The exact cause is often hard to find. Sometimes diarrhea is your body's way of getting rid of what caused an upset stomach. Viruses, food poisoning, and many medicines can cause diarrhea. Some people get diarrhea in response to emotional stress, anxiety, or certain foods.  Almost everyone has diarrhea now and then. It usually isn't serious, and your stools will return to normal soon. The important thing to do is replace the fluids you have lost, so you can prevent dehydration.  The doctor has checked you carefully, but problems can develop later. If you notice any problems or new symptoms, get medical treatment right away.  Follow-up care is a key part of your treatment and safety. Be sure to make and go to all appointments, and call your doctor if you are having problems. It's also a good idea to know your test results and keep a list of the medicines you take.  How can you care for yourself at home?  Watch for signs of dehydration, which means your body has lost too much water. Dehydration is a serious condition and should be treated right away. Signs of dehydration are:  Increasing thirst and dry eyes and mouth.  Feeling faint or lightheaded.  A smaller amount of urine than normal.  To prevent dehydration, drink plenty of fluids. Choose water and other clear liquids until you feel " "better. If you have kidney, heart, or liver disease and have to limit fluids, talk with your doctor before you increase the amount of fluids you drink.  When you feel like eating, start with small amounts of food.  The doctor may recommend that you take over-the-counter medicine, such as loperamide (Imodium). Read and follow all instructions on the label. Do not use this medicine if you have bloody diarrhea, a high fever, or other signs of serious illness. Call your doctor if you think you are having a problem with your medicine.  When should you call for help?   Call 911 anytime you think you may need emergency care. For example, call if:    You passed out (lost consciousness).     Your stools are maroon or very bloody.   Call your doctor now or seek immediate medical care if:    You are dizzy or lightheaded, or you feel like you may faint.     Your stools are black and look like tar, or they have streaks of blood.     You have new or worse belly pain.     You have symptoms of dehydration, such as:  Dry eyes and a dry mouth.  Passing only a little urine.  Cannot keep fluids down.     You have a new or higher fever.   Watch closely for changes in your health, and be sure to contact your doctor if:    Your diarrhea is getting worse.     You see pus in the diarrhea.     You are not getting better after 2 days (48 hours).   Where can you learn more?  Go to https://www.[employer].net/patiented  Enter W335 in the search box to learn more about \"Diarrhea: Care Instructions.\"  Current as of: October 19, 2024  Content Version: 14.4    4327-3313 [employer].   Care instructions adapted under license by your healthcare professional. If you have questions about a medical condition or this instruction, always ask your healthcare professional. [employer] disclaims any warranty or liability for your use of this information.    "

## 2025-04-16 ENCOUNTER — OFFICE VISIT (OUTPATIENT)
Dept: CARDIOLOGY | Facility: CLINIC | Age: 25
End: 2025-04-16
Attending: FAMILY MEDICINE
Payer: COMMERCIAL

## 2025-04-16 VITALS
WEIGHT: 108 LBS | HEIGHT: 59 IN | SYSTOLIC BLOOD PRESSURE: 98 MMHG | BODY MASS INDEX: 21.77 KG/M2 | OXYGEN SATURATION: 98 % | DIASTOLIC BLOOD PRESSURE: 66 MMHG | HEART RATE: 78 BPM

## 2025-04-16 DIAGNOSIS — R42 POSTURAL DIZZINESS WITH PRESYNCOPE: Primary | ICD-10-CM

## 2025-04-16 DIAGNOSIS — R55 POSTURAL DIZZINESS WITH PRESYNCOPE: Primary | ICD-10-CM

## 2025-04-16 DIAGNOSIS — R00.2 PALPITATIONS: ICD-10-CM

## 2025-04-16 NOTE — PATIENT INSTRUCTIONS
INCREASE SALT (SODIUM) IN YOUR DIET DAILY.  DRINK ELECTROLYTE FLUIDS ie) PEDIALYTE, GATORADE, PROPEL etc  TAKE MAGNESIUM SUPPLEMENT AT NIGHT FOR PALPITATIONS.

## 2025-04-16 NOTE — LETTER
4/16/2025    Mike Silva MD  [address]  [place] [address]    RE: Ayana Hollins       Dear Colleague,     I had the pleasure of seeing Ayana Hollins in the [facility].  HPI and Plan:   Ayana Hollins is a 24 year old female who presents with symptoms of palpitations and postural dizziness.  She has always had a low blood pressure and on some clinic visits it has been in the 80s systolic.  She had presented to PMD with the symptoms and underwent lab tests and a 48-hour Holter monitor.  Basic metabolic panel including kidney function and electrolytes was normal, TSH was normal, CBC was normal liver function tests were normal.  48-hour Holter monitor showed normal sinus rhythm without arrhythmias.  Her average heart rate was 84 bpm minimum 54 and maximum 124.  She had very few ectopic beats and short term intermittent Mobitz 1 second heart degree block during sleeping hours which is normal.  She is not taking any medications or over-the-counter supplements.  She has rare alcohol intake and no caffeine.  No family history of heart disease  Orthostatics negative today  Exam today is normal  ECG today shows a normal sinus rhythm normal axis and normal QTc  Summary    1.  Palpitations and postural dizziness-although orthostatics negative today, her blood pressure does run borderline low and at times she may become orthostatic.  Recommend increasing salt in your diet, liberalized fluid intake.  Because of low blood pressure she would not respond well to medication to treat palpitations.  Recommend considering magnesium supplement at night.  Her palpitations do seem to be worse premenstruation, consider oral contraceptive.  Will check an echocardiogram to look for any structural heart abnormalities.  I will follow-up with results of the echocardiogram once complete.  Please feel free to contact me with any questions you regards to her care      Today's clinic visit entailed:  Review  of external notes as documented elsewhere in note  Review of the result(s) of each unique test - BMP,TSH,CBC HOLTER  Ordering of each unique test    Provider  Link to MDM Help Grid     The level of medical decision making during this visit was of moderate complexity.The longitudinal plan of care for the diagnosis(es)/condition(s) as documented were addressed during this visit. Due to the added complexity in care, I will continue to support Ayana in the subsequent management and with ongoing continuity of care.     Orders Placed This Encounter   Procedures     EKG 12-lead complete w/read - Clinics (performed today)     Echocardiogram Complete     No orders of the defined types were placed in this encounter.    There are no discontinued medications.      Encounter Diagnoses   Name Primary?     Palpitations      Postural dizziness with presyncope Yes       CURRENT MEDICATIONS:  No current outpatient medications on file.       ALLERGIES   No Known Allergies    PAST MEDICAL HISTORY:  Past Medical History:   Diagnosis Date     Anxiety      Depression        PAST SURGICAL HISTORY:  Past Surgical History:   Procedure Laterality Date     NO HISTORY OF SURGERY         FAMILY HISTORY:  Family History   Problem Relation Age of Onset     No Known Problems Mother      Anxiety Disorder Father         her biological dad      No Known Problems Sister      No Known Problems Brother      Hypertension Maternal Grandmother      No Known Problems Maternal Grandfather      Unknown/Adopted Paternal Grandmother      Unknown/Adopted Paternal Grandfather      No Known Problems Other        SOCIAL HISTORY:  Social History     Socioeconomic History     Marital status: Single     Number of children: 0   Occupational History     Occupation: Baker   Tobacco Use     Smoking status: Former     Types: Vaping Device     Quit date: 2024     Years since quittin.4     Passive exposure: Never     Smokeless tobacco: Never   Vaping Use      Vaping status: Former     Devices: Disposable   Substance and Sexual Activity     Alcohol use: Yes     Comment: 2 per month     Drug use: Not Currently     Types: Marijuana     Sexual activity: Yes     Partners: Male, Female     Birth control/protection: None, Condom   Social History Narrative    Student 8th meño, nobody smokee at hoeme, she has pets at home,\    Lives with mom, step dad and  has one younger 2 yr sister .    Mom  her biological dad when  she was around 6-7      Social Drivers of Health     Financial Resource Strain: Low Risk  (7/23/2024)    Financial Resource Strain      Within the past 12 months, have you or your family members you live with been unable to get utilities (heat, electricity) when it was really needed?: No   Food Insecurity: High Risk (7/23/2024)    Food Insecurity      Within the past 12 months, did you worry that your food would run out before you got money to buy more?: Yes      Within the past 12 months, did the food you bought just not last and you didn t have money to get more?: Yes   Transportation Needs: Low Risk  (7/23/2024)    Transportation Needs      Within the past 12 months, has lack of transportation kept you from medical appointments, getting your medicines, non-medical meetings or appointments, work, or from getting things that you need?: No   Physical Activity: Inactive (7/23/2024)    Exercise Vital Sign      Days of Exercise per Week: 2 days      Minutes of Exercise per Session: 0 min   Stress: Stress Concern Present (7/23/2024)    [employer] - Occupational Stress Questionnaire      Feeling of Stress : Very much   Social Connections: Unknown (7/23/2024)    Social Connection and Isolation Panel [NHANES]      Frequency of Social Gatherings with Friends and Family: Once a week   Interpersonal Safety: Low Risk  (3/3/2025)    Interpersonal Safety      Do you feel physically and emotionally safe where you currently live?: Yes       "Within the past 12 months, have you been hit, slapped, kicked or otherwise physically hurt by someone?: No      Within the past 12 months, have you been humiliated or emotionally abused in other ways by your partner or ex-partner?: No   Housing Stability: Low Risk  (7/23/2024)    Housing Stability      Do you have housing? : Yes      Are you worried about losing your housing?: No       Review of Systems:  Skin:        Eyes:       ENT:       Respiratory:       Cardiovascular:       Gastroenterology:      Genitourinary:       Musculoskeletal:       Neurologic:       Psychiatric:       Heme/Lymph/Imm:       Endocrine:         Physical Exam:  Vitals: BP 98/66   Pulse 78   Ht 1.499 m (4' 11\")   Wt 49 kg (108 lb)   LMP 02/19/2025 (Exact Date)   SpO2 98%   BMI 21.81 kg/m      Constitutional:  cooperative, in no acute distress        Skin:  warm and dry to the touch          Head:  normocephalic        Eyes:  pupils equal and round        Lymph:      ENT:  no pallor or cyanosis        Neck:  no carotid bruit        Respiratory:  clear to auscultation, normal symmetry         Cardiac: regular rhythm, no murmurs, gallops or rubs detected                pulses full and equal                                        GI:  abdomen soft        Extremities and Muscular Skeletal:  no deformities, clubbing, cyanosis, erythema observed, no edema              Neurological:  no gross motor deficits, affect appropriate        Psych:  Alert and Oriented x 3        Recent Lab Results:  LIPID RESULTS:  Lab Results   Component Value Date    CHOL 170 (H) 05/24/2021    HDL 46 05/24/2021     (H) 05/24/2021    TRIG 44 05/24/2021       LIVER ENZYME RESULTS:  Lab Results   Component Value Date    AST 26 03/03/2025    AST 32 02/08/2008    ALT 16 03/03/2025    ALT 15 02/08/2008       CBC RESULTS:  Lab Results   Component Value Date    WBC 6.2 03/03/2025    WBC 6.8 09/17/2019    RBC 4.74 03/03/2025    RBC 4.76 09/17/2019    HGB 13.7 " "03/03/2025    HGB 13.8 09/17/2019    HCT 40.7 03/03/2025    HCT 41.2 09/17/2019    MCV 86 03/03/2025    MCV 87 09/17/2019    MCH 28.9 03/03/2025    MCH 29.0 09/17/2019    MCHC 33.7 03/03/2025    MCHC 33.5 09/17/2019    RDW 11.2 03/03/2025    RDW 11.5 09/17/2019     03/03/2025     09/17/2019       BMP RESULTS:  Lab Results   Component Value Date     03/03/2025     09/17/2019    POTASSIUM 4.2 03/03/2025    POTASSIUM 3.9 01/25/2022    POTASSIUM 3.5 09/17/2019    CHLORIDE 103 03/03/2025    CHLORIDE 106 01/25/2022    CHLORIDE 107 09/17/2019    CO2 26 03/03/2025    CO2 28 01/25/2022    CO2 30 09/17/2019    ANIONGAP 10 03/03/2025    ANIONGAP 4 01/25/2022    ANIONGAP 3 09/17/2019    GLC 71 03/03/2025    GLC 97 01/25/2022    GLC 76 05/24/2021    BUN 13.3 03/03/2025    BUN 16 01/25/2022    BUN 15 09/17/2019    CR 0.62 03/03/2025    CR 0.65 09/17/2019    GFRESTIMATED >90 03/03/2025    GFRESTIMATED >90 09/17/2019    GFRESTBLACK >90 09/17/2019    JUNI 10.1 03/03/2025    JUNI 9.2 09/17/2019        A1C RESULTS:  No results found for: \"A1C\"    INR RESULTS:  No results found for: \"INR\"        CC  Mike Silva MD  [address] [place] [address] [place] [address]                Thank you for allowing me to participate in the care of your patient.      Sincerely,     Marian Vega DO     [facility]  cc:   Mike Silva MD  [address] [place] [address] [place] [address]      "

## 2025-04-16 NOTE — PROGRESS NOTES
HPI and Plan:   Ayana Hollins is a 24 year old female who presents with symptoms of palpitations and postural dizziness.  She has always had a low blood pressure and on some clinic visits it has been in the 80s systolic.  She had presented to PMD with the symptoms and underwent lab tests and a 48-hour Holter monitor.  Basic metabolic panel including kidney function and electrolytes was normal, TSH was normal, CBC was normal liver function tests were normal.  48-hour Holter monitor showed normal sinus rhythm without arrhythmias.  Her average heart rate was 84 bpm minimum 54 and maximum 124.  She had very few ectopic beats and short term intermittent Mobitz 1 second heart degree block during sleeping hours which is normal.  She is not taking any medications or over-the-counter supplements.  She has rare alcohol intake and no caffeine.  No family history of heart disease  Orthostatics negative today  Exam today is normal  ECG today shows a normal sinus rhythm normal axis and normal QTc  Summary    1.  Palpitations and postural dizziness-although orthostatics negative today, her blood pressure does run borderline low and at times she may become orthostatic.  Recommend increasing salt in your diet, liberalized fluid intake.  Because of low blood pressure she would not respond well to medication to treat palpitations.  Recommend considering magnesium supplement at night.  Her palpitations do seem to be worse premenstruation, consider oral contraceptive.  Will check an echocardiogram to look for any structural heart abnormalities.  I will follow-up with results of the echocardiogram once complete.  Please feel free to contact me with any questions you regards to her care      Today's clinic visit entailed:  Review of external notes as documented elsewhere in note  Review of the result(s) of each unique test - BMP,TSH,CBC HOLTER  Ordering of each unique test    Provider  Link to [facility] Help Grid     The level of medical  decision making during this visit was of moderate complexity.The longitudinal plan of care for the diagnosis(es)/condition(s) as documented were addressed during this visit. Due to the added complexity in care, I will continue to support Ayana in the subsequent management and with ongoing continuity of care.     Orders Placed This Encounter   Procedures    EKG 12-lead complete w/read - Clinics (performed today)    Echocardiogram Complete     No orders of the defined types were placed in this encounter.    There are no discontinued medications.      Encounter Diagnoses   Name Primary?    Palpitations     Postural dizziness with presyncope Yes       CURRENT MEDICATIONS:  No current outpatient medications on file.       ALLERGIES   No Known Allergies    PAST MEDICAL HISTORY:  Past Medical History:   Diagnosis Date    Anxiety     Depression        PAST SURGICAL HISTORY:  Past Surgical History:   Procedure Laterality Date    NO HISTORY OF SURGERY         FAMILY HISTORY:  Family History   Problem Relation Age of Onset    No Known Problems Mother     Anxiety Disorder Father         her biological dad     No Known Problems Sister     No Known Problems Brother     Hypertension Maternal Grandmother     No Known Problems Maternal Grandfather     Unknown/Adopted Paternal Grandmother     Unknown/Adopted Paternal Grandfather     No Known Problems Other        SOCIAL HISTORY:  Social History     Socioeconomic History    Marital status: Single    Number of children: 0   Occupational History    Occupation: Baker   Tobacco Use    Smoking status: Former     Types: Vaping Device     Quit date: 2024     Years since quittin.4     Passive exposure: Never    Smokeless tobacco: Never   Vaping Use    Vaping status: Former    Devices: Disposable   Substance and Sexual Activity    Alcohol use: Yes     Comment: 2 per month    Drug use: Not Currently     Types: Marijuana    Sexual activity: Yes     Partners: Male, Female     Birth  control/protection: None, Condom   Social History Narrative    Student 8th meño, nobody smokee at hoeme, she has pets at home,\    Lives with mom, step dad and  has one younger 2 yr sister .    Mom  her biological dad when  she was around 6-7      Social Drivers of Health     Financial Resource Strain: Low Risk  (7/23/2024)    Financial Resource Strain     Within the past 12 months, have you or your family members you live with been unable to get utilities (heat, electricity) when it was really needed?: No   Food Insecurity: High Risk (7/23/2024)    Food Insecurity     Within the past 12 months, did you worry that your food would run out before you got money to buy more?: Yes     Within the past 12 months, did the food you bought just not last and you didn t have money to get more?: Yes   Transportation Needs: Low Risk  (7/23/2024)    Transportation Needs     Within the past 12 months, has lack of transportation kept you from medical appointments, getting your medicines, non-medical meetings or appointments, work, or from getting things that you need?: No   Physical Activity: Inactive (7/23/2024)    Exercise Vital Sign     Days of Exercise per Week: 2 days     Minutes of Exercise per Session: 0 min   Stress: Stress Concern Present (7/23/2024)    [employer] - Occupational Stress Questionnaire     Feeling of Stress : Very much   Social Connections: Unknown (7/23/2024)    Social Connection and Isolation Panel [NHANES]     Frequency of Social Gatherings with Friends and Family: Once a week   Interpersonal Safety: Low Risk  (3/3/2025)    Interpersonal Safety     Do you feel physically and emotionally safe where you currently live?: Yes     Within the past 12 months, have you been hit, slapped, kicked or otherwise physically hurt by someone?: No     Within the past 12 months, have you been humiliated or emotionally abused in other ways by your partner or ex-partner?: No   Housing  "Stability: Low Risk  (7/23/2024)    Housing Stability     Do you have housing? : Yes     Are you worried about losing your housing?: No       Review of Systems:  Skin:        Eyes:       ENT:       Respiratory:       Cardiovascular:       Gastroenterology:      Genitourinary:       Musculoskeletal:       Neurologic:       Psychiatric:       Heme/Lymph/Imm:       Endocrine:         Physical Exam:  Vitals: BP 98/66   Pulse 78   Ht 1.499 m (4' 11\")   Wt 49 kg (108 lb)   LMP 02/19/2025 (Exact Date)   SpO2 98%   BMI 21.81 kg/m      Constitutional:  cooperative, in no acute distress        Skin:  warm and dry to the touch          Head:  normocephalic        Eyes:  pupils equal and round        Lymph:      ENT:  no pallor or cyanosis        Neck:  no carotid bruit        Respiratory:  clear to auscultation, normal symmetry         Cardiac: regular rhythm, no murmurs, gallops or rubs detected                pulses full and equal                                        GI:  abdomen soft        Extremities and Muscular Skeletal:  no deformities, clubbing, cyanosis, erythema observed, no edema              Neurological:  no gross motor deficits, affect appropriate        Psych:  Alert and Oriented x 3        Recent Lab Results:  LIPID RESULTS:  Lab Results   Component Value Date    CHOL 170 (H) 05/24/2021    HDL 46 05/24/2021     (H) 05/24/2021    TRIG 44 05/24/2021       LIVER ENZYME RESULTS:  Lab Results   Component Value Date    AST 26 03/03/2025    AST 32 02/08/2008    ALT 16 03/03/2025    ALT 15 02/08/2008       CBC RESULTS:  Lab Results   Component Value Date    WBC 6.2 03/03/2025    WBC 6.8 09/17/2019    RBC 4.74 03/03/2025    RBC 4.76 09/17/2019    HGB 13.7 03/03/2025    HGB 13.8 09/17/2019    HCT 40.7 03/03/2025    HCT 41.2 09/17/2019    MCV 86 03/03/2025    MCV 87 09/17/2019    MCH 28.9 03/03/2025    MCH 29.0 09/17/2019    MCHC 33.7 03/03/2025    MCHC 33.5 09/17/2019    RDW 11.2 03/03/2025    RDW 11.5 " "09/17/2019     03/03/2025     09/17/2019       BMP RESULTS:  Lab Results   Component Value Date     03/03/2025     09/17/2019    POTASSIUM 4.2 03/03/2025    POTASSIUM 3.9 01/25/2022    POTASSIUM 3.5 09/17/2019    CHLORIDE 103 03/03/2025    CHLORIDE 106 01/25/2022    CHLORIDE 107 09/17/2019    CO2 26 03/03/2025    CO2 28 01/25/2022    CO2 30 09/17/2019    ANIONGAP 10 03/03/2025    ANIONGAP 4 01/25/2022    ANIONGAP 3 09/17/2019    GLC 71 03/03/2025    GLC 97 01/25/2022    GLC 76 05/24/2021    BUN 13.3 03/03/2025    BUN 16 01/25/2022    BUN 15 09/17/2019    CR 0.62 03/03/2025    CR 0.65 09/17/2019    GFRESTIMATED >90 03/03/2025    GFRESTIMATED >90 09/17/2019    GFRESTBLACK >90 09/17/2019    JUNI 10.1 03/03/2025    JUNI 9.2 09/17/2019        A1C RESULTS:  No results found for: \"A1C\"    INR RESULTS:  No results found for: \"INR\"        CC  Mike Silva MD  [address] [place] [address]                "

## 2025-04-24 ENCOUNTER — OFFICE VISIT (OUTPATIENT)
Dept: FAMILY MEDICINE | Facility: CLINIC | Age: 25
End: 2025-04-24
Payer: COMMERCIAL

## 2025-04-24 VITALS
TEMPERATURE: 98.2 F | HEART RATE: 76 BPM | SYSTOLIC BLOOD PRESSURE: 102 MMHG | BODY MASS INDEX: 21.97 KG/M2 | RESPIRATION RATE: 11 BRPM | HEIGHT: 59 IN | DIASTOLIC BLOOD PRESSURE: 58 MMHG | OXYGEN SATURATION: 100 % | WEIGHT: 109 LBS

## 2025-04-24 DIAGNOSIS — R11.0 NAUSEA: ICD-10-CM

## 2025-04-24 DIAGNOSIS — J06.9 UPPER RESPIRATORY TRACT INFECTION, UNSPECIFIED TYPE: Primary | ICD-10-CM

## 2025-04-24 LAB
DEPRECATED S PYO AG THROAT QL EIA: NEGATIVE
FLUAV RNA SPEC QL NAA+PROBE: NEGATIVE
FLUBV RNA RESP QL NAA+PROBE: NEGATIVE
HCG UR QL: NEGATIVE
RSV RNA SPEC NAA+PROBE: NEGATIVE
S PYO DNA THROAT QL NAA+PROBE: NOT DETECTED
SARS-COV-2 RNA RESP QL NAA+PROBE: NEGATIVE

## 2025-04-24 ASSESSMENT — PATIENT HEALTH QUESTIONNAIRE - PHQ9
SUM OF ALL RESPONSES TO PHQ QUESTIONS 1-9: 11
10. IF YOU CHECKED OFF ANY PROBLEMS, HOW DIFFICULT HAVE THESE PROBLEMS MADE IT FOR YOU TO DO YOUR WORK, TAKE CARE OF THINGS AT HOME, OR GET ALONG WITH OTHER PEOPLE: VERY DIFFICULT
SUM OF ALL RESPONSES TO PHQ QUESTIONS 1-9: 11

## 2025-04-24 ASSESSMENT — PAIN SCALES - GENERAL: PAINLEVEL_OUTOF10: MILD PAIN (3)

## 2025-04-24 NOTE — PROGRESS NOTES
"  Assessment & Plan     Upper respiratory tract infection, unspecified type  Check strep, flu/covid  Supportive cares- otc analgesic, rest, fluids.     Nausea  UPT negative. Suspect nausea is related to viral syndrome.   Follow-up if doesn't improve.    - HCG qualitative urine; Future      Discussed LARCS, depo, CHC. She'd like nexplanon, asked her to schedule appt for insertion.           Atul Piña is a 24 year old, presenting for the following health issues:  Covid Concern and Forms (Work note)        4/24/2025     1:50 PM   Additional Questions   Roomed by Roseline COX         4/24/2025   Forms   Any forms needing to be completed Yes     History of Present Illness       Reason for visit:  Covid testing  Symptom onset:  3-7 days ago   She is taking medications regularly.          Acute Illness  Acute illness concerns: covid concern  Onset/Duration: past Sunday 4/20  Symptoms:  Fever: No  Chills/Sweats: YES  Headache (location?): YES  Sinus Pressure: No  Conjunctivitis:  No  Ear Pain: YES: both  Rhinorrhea: YES  Congestion: YES  Sore Throat: YES, itchy  Cough: no  Wheeze: No  Decreased Appetite: No  Nausea: YES  Vomiting: No  Diarrhea: No  Dysuria/Freq.: No  Dysuria or Hematuria: No  Fatigue/Achiness: YES  Sick/Strep Exposure: No  Therapies tried and outcome: tylenol , not helpful      LMP 3/29  CM: none.   1 male partner  Doesn't want pregnancy.       Review of Systems  +subjective fever, nausea, sore throat.   No vomiting, diarrhea, cough.       Objective    /58 (BP Location: Right arm, Patient Position: Sitting, Cuff Size: Adult Regular)   Pulse 76   Temp 98.2  F (36.8  C) (Tympanic)   Resp 11   Ht 1.502 m (4' 11.13\")   Wt 49.4 kg (109 lb)   LMP 03/26/2025 (Approximate)   SpO2 100%   BMI 21.92 kg/m    Body mass index is 21.92 kg/m .  Physical Exam  Constitutional:       Appearance: Normal appearance.   HENT:      Right Ear: Tympanic membrane normal.      Left Ear: Tympanic membrane normal. "      Nose: Nose normal. No congestion or rhinorrhea.      Mouth/Throat:      Mouth: Mucous membranes are moist.      Pharynx: Oropharynx is clear. Posterior oropharyngeal erythema present. No oropharyngeal exudate.   Cardiovascular:      Rate and Rhythm: Normal rate and regular rhythm.      Heart sounds: Normal heart sounds.   Pulmonary:      Effort: Pulmonary effort is normal.      Breath sounds: Normal breath sounds.   Abdominal:      General: Bowel sounds are normal. There is no distension.      Palpations: Abdomen is soft. There is no mass.   Lymphadenopathy:      Cervical: Cervical adenopathy present.   Neurological:      Mental Status: She is alert.                    Signed Electronically by: ZEINA Andres CNP

## 2025-04-28 ENCOUNTER — PATIENT OUTREACH (OUTPATIENT)
Dept: CARE COORDINATION | Facility: CLINIC | Age: 25
End: 2025-04-28
Payer: COMMERCIAL

## 2025-05-15 ENCOUNTER — E-VISIT (OUTPATIENT)
Dept: FAMILY MEDICINE | Facility: CLINIC | Age: 25
End: 2025-05-15
Payer: COMMERCIAL

## 2025-05-15 DIAGNOSIS — R22.1 LUMP ON NECK: Primary | ICD-10-CM

## 2025-06-04 ENCOUNTER — RESULTS FOLLOW-UP (OUTPATIENT)
Dept: CARDIOLOGY | Facility: CLINIC | Age: 25
End: 2025-06-04

## 2025-06-04 ENCOUNTER — HOSPITAL ENCOUNTER (OUTPATIENT)
Dept: CARDIOLOGY | Facility: CLINIC | Age: 25
Discharge: HOME | End: 2025-06-04
Attending: INTERNAL MEDICINE
Payer: COMMERCIAL

## 2025-06-04 ENCOUNTER — OFFICE VISIT (OUTPATIENT)
Dept: FAMILY MEDICINE | Facility: CLINIC | Age: 25
End: 2025-06-04
Payer: COMMERCIAL

## 2025-06-04 VITALS
WEIGHT: 109.8 LBS | HEART RATE: 71 BPM | HEIGHT: 59 IN | DIASTOLIC BLOOD PRESSURE: 69 MMHG | TEMPERATURE: 98.6 F | BODY MASS INDEX: 22.13 KG/M2 | SYSTOLIC BLOOD PRESSURE: 111 MMHG | RESPIRATION RATE: 18 BRPM | OXYGEN SATURATION: 97 %

## 2025-06-04 DIAGNOSIS — R00.2 PALPITATIONS: ICD-10-CM

## 2025-06-04 DIAGNOSIS — F33.1 MAJOR DEPRESSIVE DISORDER, RECURRENT EPISODE, MODERATE (H): Primary | ICD-10-CM

## 2025-06-04 DIAGNOSIS — R11.0 NAUSEA: ICD-10-CM

## 2025-06-04 DIAGNOSIS — I95.9 HYPOTENSION, UNSPECIFIED HYPOTENSION TYPE: ICD-10-CM

## 2025-06-04 DIAGNOSIS — Z11.3 SCREENING FOR STDS (SEXUALLY TRANSMITTED DISEASES): ICD-10-CM

## 2025-06-04 DIAGNOSIS — R55 POSTURAL DIZZINESS WITH PRESYNCOPE: ICD-10-CM

## 2025-06-04 DIAGNOSIS — R59.1 LYMPHADENOPATHY: ICD-10-CM

## 2025-06-04 DIAGNOSIS — R42 POSTURAL DIZZINESS WITH PRESYNCOPE: ICD-10-CM

## 2025-06-04 DIAGNOSIS — F41.9 ANXIETY: ICD-10-CM

## 2025-06-04 LAB — LVEF ECHO: NORMAL

## 2025-06-04 PROCEDURE — 83690 ASSAY OF LIPASE: CPT | Performed by: INTERNAL MEDICINE

## 2025-06-04 PROCEDURE — 3074F SYST BP LT 130 MM HG: CPT | Performed by: INTERNAL MEDICINE

## 2025-06-04 PROCEDURE — 93306 TTE W/DOPPLER COMPLETE: CPT | Mod: 26 | Performed by: INTERNAL MEDICINE

## 2025-06-04 PROCEDURE — 82150 ASSAY OF AMYLASE: CPT | Performed by: INTERNAL MEDICINE

## 2025-06-04 PROCEDURE — 3078F DIAST BP <80 MM HG: CPT | Performed by: INTERNAL MEDICINE

## 2025-06-04 PROCEDURE — 93306 TTE W/DOPPLER COMPLETE: CPT

## 2025-06-04 PROCEDURE — 36415 COLL VENOUS BLD VENIPUNCTURE: CPT | Performed by: INTERNAL MEDICINE

## 2025-06-04 PROCEDURE — 87591 N.GONORRHOEAE DNA AMP PROB: CPT | Performed by: INTERNAL MEDICINE

## 2025-06-04 PROCEDURE — 99214 OFFICE O/P EST MOD 30 MIN: CPT | Performed by: INTERNAL MEDICINE

## 2025-06-04 PROCEDURE — 1126F AMNT PAIN NOTED NONE PRSNT: CPT | Performed by: INTERNAL MEDICINE

## 2025-06-04 PROCEDURE — 87491 CHLMYD TRACH DNA AMP PROBE: CPT | Performed by: INTERNAL MEDICINE

## 2025-06-04 RX ORDER — OMEPRAZOLE 20 MG/1
20 CAPSULE, DELAYED RELEASE ORAL DAILY
Qty: 90 CAPSULE | Refills: 0 | Status: SHIPPED | OUTPATIENT
Start: 2025-06-04

## 2025-06-04 ASSESSMENT — PATIENT HEALTH QUESTIONNAIRE - PHQ9: SUM OF ALL RESPONSES TO PHQ QUESTIONS 1-9: 11

## 2025-06-04 ASSESSMENT — PAIN SCALES - GENERAL: PAINLEVEL_OUTOF10: NO PAIN (0)

## 2025-06-04 NOTE — PROGRESS NOTES
"  {PROVIDER CHARTING PREFERENCE:260656}    Subjective   Ayana is a 24 year old, presenting for the following health issues:  Establish Care      6/4/2025     3:47 PM   Additional Questions   Roomed by Amy SALEH MA     History of Present Illness       Reason for visit:  Discuss low blood pressure,lumps in neck,heart palpitations,tired,confused,feeling faint    She eats 4 or more servings of fruits and vegetables daily.She consumes 0 sweetened beverage(s) daily.She exercises with enough effort to increase her heart rate 30 to 60 minutes per day.  She exercises with enough effort to increase her heart rate 4 days per week.   She is taking medications regularly.      Ayana Hollins is here to establish care.    She was on selective serotonin reuptake inhibitor lexapro for depression and anxiety. She stopped taking it because she \" didn't want to dependent on the meds\" because when she tried to stop cold turkey she would feel withdrawal symptoms.      Low blood pressure: started a few years ago, lightheaded, weakness in the body, her blood pressure is normal in the office.     She was seen by cardiology one month ago: see note    Nausea. With food intake. Also when she is hungry   Stools normal  Periods normal, PMS mood symptoms.       Fam hx: grandmother maternal with HTN  Nonsmoker. Former smoker. Quit one year ago, smoked for 5 years - vaping.   Stopped marijuana one year ago as well.   Etoh: occasionally < 1 drink per week.        Objective    LMP 03/26/2025 (Approximate)   There is no height or weight on file to calculate BMI.  Physical Exam   {Exam List (Optional):650996}    {Diagnostic Test Results (Optional):126157}        Signed Electronically by: Niesha Alba MD  {Email feedback regarding this note to [email or web address]   :060820}  " "enlargement.  Will get an ultrasound dedicated to the lymph nodes of the neck.  - US Head Neck Soft Tissue; Future    Follow-up  No follow-ups on file.    Subjective   Ayana is a 24 year old, presenting for the following health issues:  Establish Care        6/4/2025     3:47 PM   Additional Questions   Roomed by Amy SALEH MA     History of Present Illness       Reason for visit:  Discuss low blood pressure,lumps in neck,heart palpitations,tired,confused,feeling faint    She eats 4 or more servings of fruits and vegetables daily.She consumes 0 sweetened beverage(s) daily.She exercises with enough effort to increase her heart rate 30 to 60 minutes per day.  She exercises with enough effort to increase her heart rate 4 days per week.   She is taking medications regularly.      Ayana Hollins is here to establish care.    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  PTH has been normal    Fam hx: grandmother maternal with HTN  Nonsmoker. Former smoker. Quit one year ago, smoked for 5 years - vaping.   Stopped marijuana one year ago as well.   Etoh: occasionally < 1 drink per week.        Objective    /69 (BP Location: Left arm, Patient Position: Sitting, Cuff Size: Adult Regular)   Pulse 71   Temp 98.6  F (37  C) (Temporal)   Resp 18   Ht 1.502 m (4' 11.13\")   Wt 49.8 kg (109 lb 12.8 oz)   LMP 03/24/2025 (Exact Date)   SpO2 97%   BMI 22.08 kg/m    Body mass index is 22.08 kg/m .  Physical Exam           Signed Electronically by: Niesha Alba MD    "

## 2025-06-04 NOTE — PATIENT INSTRUCTIONS
Please call [facility] at [phone] to schedule your CT scan of the abdomen and pelvis.

## 2025-06-04 NOTE — PROGRESS NOTES
Preventive Care Visit  [facility] ROCKY Alba MD, Internal Medicine  Jun 4, 2025  {Provider  Link to [facility] :897455}    {PROVIDER CHARTING PREFERENCE:866645}    Atul Piña is a 24 year old, presenting for the following:  Establish Care        6/4/2025     3:47 PM   Additional Questions   Roomed by Amy SALEH MA          HPI  ***   {MA/LPN/RN Pre-Provider Visit Orders- hCG/UA/Strep (Optional):810401}  {SUPERLIST (Optional):390652}  {additonal problems for provider to add (Optional):489776}  Advance Care Planning  {The storyboard will display whether the patient has ACP docs on file. Hover over the Code section in the storyboard to access the ACP documents. :528450}  {(AWV REQUIRED) Advance Care Planning Reviewed:404475}        7/23/2024   General Health   How would you rate your overall physical health? (!) FAIR   Feel stress (tense, anxious, or unable to sleep) Very much         7/23/2024   Nutrition   Three or more servings of calcium each day? (!) NO   Diet: Regular (no restrictions)    Breakfast skipped   How many servings of fruit and vegetables per day? (!) 2-3   How many sweetened beverages each day? 0-1       Multiple values from one day are sorted in reverse-chronological order         7/23/2024   Exercise   Days per week of moderate/strenous exercise 2 days   Average minutes spent exercising at this level 0 min         7/23/2024   Social Factors   Frequency of gathering with friends or relatives Once a week   Worry food won't last until get money to buy more Yes   Food not last or not have enough money for food? Yes   Do you have housing? (Housing is defined as stable permanent housing and does not include staying outside in a car, in a tent, in an abandoned building, in an overnight shelter, or couch-surfing.) Yes   Are you worried about losing your housing? No   Lack of transportation? No   Unable to get utilities (heat,electricity)? No         7/23/2024   Dental  "  Dentist two times every year? (!) NO       { Rooming Staff Patient needs a PHQ as part of the AWV.  Use this link to complete and then refresh the note to pull results Link to PHQ9 Assessment :271652}  {USE TO PULL IN PHQ RESULTS FOR TODAY:141164}        2024   Substance Use   If I could quit smoking, I would Completely agree   I want to quit somking, worry about health affects Completely agree   Willing to make a plan to quit smoking Completely agree   Willing to cut down before quitting Completely agree   Alcohol more than 3/day or more than 7/wk No   Do you use any other substances recreationally? (!) CANNABIS PRODUCTS     Social History     Tobacco Use    Smoking status: Former     Types: Vaping Device     Quit date: 2024     Years since quittin.5     Passive exposure: Never    Smokeless tobacco: Never   Vaping Use    Vaping status: Former    Devices: Disposable   Substance Use Topics    Alcohol use: Yes     Comment: 2 per month    Drug use: Not Currently     Types: Marijuana     {Provider  If there are gaps in the social history shown above, please follow the link to update and then refresh the note Link to Social and Substance History :035502}    History of abnormal Pap smear: { :845214}        3/22/2023     9:04 AM   PAP / HPV   PAP Negative for Intraepithelial Lesion or Malignancy (NILM)            2024   Contraception/Family Planning   Questions about contraception or family planning No     {Provider  REQUIRED FOR AWV Use the storyboard to review patient history, after sections have been marked as reviewed, refresh note to capture documentation:366309}   Reviewed and updated as needed this visit by Provider                    {HISTORY OPTIONS (Optional):594421}    {ROS Picklists (Optional):674740}     Objective    Exam  LMP 2025 (Approximate)    Estimated body mass index is 21.92 kg/m  as calculated from the following:    Height as of 25: 1.502 m (4' 11.13\").    Weight as " of 4/24/25: 49.4 kg (109 lb).    Physical Exam  {Exam Choices (Optional):047033}        Signed Electronically by: Niesha Alba MD  {Email feedback regarding this note to [email or web address]   :211111}

## 2025-06-05 LAB
AMYLASE SERPL-CCNC: 125 U/L (ref 28–100)
C TRACH DNA SPEC QL PROBE+SIG AMP: NEGATIVE
LIPASE SERPL-CCNC: 31 U/L (ref 13–60)
N GONORRHOEA DNA SPEC QL NAA+PROBE: NEGATIVE
SPECIMEN TYPE: NORMAL

## 2025-06-06 ENCOUNTER — RESULTS FOLLOW-UP (OUTPATIENT)
Dept: FAMILY MEDICINE | Facility: CLINIC | Age: 25
End: 2025-06-06

## 2025-06-06 DIAGNOSIS — R59.1 LYMPHADENOPATHY: Primary | ICD-10-CM

## 2025-06-06 DIAGNOSIS — E04.1 THYROID NODULE: ICD-10-CM

## 2025-06-09 ENCOUNTER — PATIENT OUTREACH (OUTPATIENT)
Dept: FAMILY MEDICINE | Facility: CLINIC | Age: 25
End: 2025-06-09
Payer: COMMERCIAL

## 2025-06-09 NOTE — TELEPHONE ENCOUNTER
Patient Quality Outreach    Patient is due for the following:   Depression  -  PHQ-9 needed    Action(s) Taken:   Patient was assigned appropriate questionnaire to complete    Type of outreach:    Sent [employer] message.    Questions for provider review:    [facility]  Chart routed to None.

## 2025-06-16 ENCOUNTER — ANCILLARY PROCEDURE (OUTPATIENT)
Dept: ULTRASOUND IMAGING | Facility: CLINIC | Age: 25
End: 2025-06-16
Attending: INTERNAL MEDICINE
Payer: COMMERCIAL

## 2025-06-16 DIAGNOSIS — R59.1 LYMPHADENOPATHY: ICD-10-CM

## 2025-06-16 PROCEDURE — 76536 US EXAM OF HEAD AND NECK: CPT

## 2025-06-20 ENCOUNTER — ANCILLARY PROCEDURE (OUTPATIENT)
Dept: CT IMAGING | Facility: CLINIC | Age: 25
End: 2025-06-20
Attending: INTERNAL MEDICINE
Payer: COMMERCIAL

## 2025-06-20 DIAGNOSIS — R11.0 NAUSEA: ICD-10-CM

## 2025-06-20 PROCEDURE — 74177 CT ABD & PELVIS W/CONTRAST: CPT

## 2025-06-20 PROCEDURE — 250N000011 HC RX IP 250 OP 636: Performed by: INTERNAL MEDICINE

## 2025-06-20 PROCEDURE — 250N000009 HC RX 250: Performed by: INTERNAL MEDICINE

## 2025-06-20 RX ORDER — IOPAMIDOL 755 MG/ML
54 INJECTION, SOLUTION INTRAVASCULAR ONCE
Status: COMPLETED | OUTPATIENT
Start: 2025-06-20 | End: 2025-06-20

## 2025-06-20 RX ADMIN — IOPAMIDOL 54 ML: 755 INJECTION, SOLUTION INTRAVENOUS at 15:43

## 2025-06-20 RX ADMIN — SODIUM CHLORIDE 50 ML: 9 INJECTION, SOLUTION INTRAVENOUS at 15:43

## 2025-06-27 ENCOUNTER — E-VISIT (OUTPATIENT)
Dept: FAMILY MEDICINE | Facility: CLINIC | Age: 25
End: 2025-06-27
Payer: COMMERCIAL

## 2025-06-27 DIAGNOSIS — H10.31 ACUTE CONJUNCTIVITIS OF RIGHT EYE, UNSPECIFIED ACUTE CONJUNCTIVITIS TYPE: Primary | ICD-10-CM

## 2025-06-28 ENCOUNTER — MYC MEDICAL ADVICE (OUTPATIENT)
Dept: FAMILY MEDICINE | Facility: CLINIC | Age: 25
End: 2025-06-28
Payer: COMMERCIAL

## 2025-06-29 RX ORDER — POLYMYXIN B SULFATE AND TRIMETHOPRIM 1; 10000 MG/ML; [USP'U]/ML
SOLUTION OPHTHALMIC
Qty: 10 ML | Refills: 0 | Status: SHIPPED | OUTPATIENT
Start: 2025-06-29 | End: 2025-07-06

## 2025-06-29 NOTE — PATIENT INSTRUCTIONS
Thank you for choosing us for your care. I have placed an order for a prescription so that you can start treatment. View your full visit summary for details by clicking on the link below. Your pharmacist will able to address any questions you may have about the medication.     If you re not feeling better within 2-3 days, please schedule an appointment.  You can schedule an appointment right here in [facility], or call [phone]  If the visit is for the same symptoms as your eVisit, we ll refund the cost of your eVisit if seen within seven days.

## 2025-06-30 NOTE — TELEPHONE ENCOUNTER
Please see e-visit dated 06/27/255.    Debbie DUPREE,  Abdulkadir RN  [facility] Internal [facility]

## 2025-08-05 ENCOUNTER — OFFICE VISIT (OUTPATIENT)
Dept: FAMILY MEDICINE | Facility: CLINIC | Age: 25
End: 2025-08-05
Payer: COMMERCIAL

## 2025-08-05 VITALS
TEMPERATURE: 97.3 F | BODY MASS INDEX: 22.36 KG/M2 | WEIGHT: 110.9 LBS | SYSTOLIC BLOOD PRESSURE: 106 MMHG | HEIGHT: 59 IN | RESPIRATION RATE: 20 BRPM | HEART RATE: 78 BPM | DIASTOLIC BLOOD PRESSURE: 72 MMHG | OXYGEN SATURATION: 97 %

## 2025-08-05 DIAGNOSIS — R10.12 LUQ ABDOMINAL PAIN: Primary | ICD-10-CM

## 2025-08-05 LAB
ALBUMIN SERPL BCG-MCNC: 4.4 G/DL (ref 3.5–5.2)
ALP SERPL-CCNC: 53 U/L (ref 40–150)
ALT SERPL W P-5'-P-CCNC: 40 U/L (ref 0–50)
ANION GAP SERPL CALCULATED.3IONS-SCNC: 8 MMOL/L (ref 7–15)
AST SERPL W P-5'-P-CCNC: 22 U/L (ref 0–45)
BILIRUB SERPL-MCNC: 0.3 MG/DL
BUN SERPL-MCNC: 16.3 MG/DL (ref 6–20)
CALCIUM SERPL-MCNC: 9.4 MG/DL (ref 8.8–10.4)
CHLORIDE SERPL-SCNC: 104 MMOL/L (ref 98–107)
CREAT SERPL-MCNC: 0.59 MG/DL (ref 0.51–0.95)
EGFRCR SERPLBLD CKD-EPI 2021: >90 ML/MIN/1.73M2
ERYTHROCYTE [DISTWIDTH] IN BLOOD BY AUTOMATED COUNT: 11.4 % (ref 10–15)
GLUCOSE SERPL-MCNC: 82 MG/DL (ref 70–99)
HCG SERPL QL: NEGATIVE
HCO3 SERPL-SCNC: 25 MMOL/L (ref 22–29)
HCT VFR BLD AUTO: 42.1 % (ref 35–47)
HGB BLD-MCNC: 13.5 G/DL (ref 11.7–15.7)
LIPASE SERPL-CCNC: 25 U/L (ref 13–60)
MCH RBC QN AUTO: 27.9 PG (ref 26.5–33)
MCHC RBC AUTO-ENTMCNC: 32.1 G/DL (ref 31.5–36.5)
MCV RBC AUTO: 87 FL (ref 78–100)
PLATELET # BLD AUTO: 270 10E3/UL (ref 150–450)
POTASSIUM SERPL-SCNC: 4.3 MMOL/L (ref 3.4–5.3)
PROT SERPL-MCNC: 7.2 G/DL (ref 6.4–8.3)
RBC # BLD AUTO: 4.84 10E6/UL (ref 3.8–5.2)
SODIUM SERPL-SCNC: 137 MMOL/L (ref 135–145)
WBC # BLD AUTO: 4.9 10E3/UL (ref 4–11)

## 2025-08-05 PROCEDURE — 36415 COLL VENOUS BLD VENIPUNCTURE: CPT | Performed by: PHYSICIAN ASSISTANT

## 2025-08-05 PROCEDURE — 83690 ASSAY OF LIPASE: CPT | Performed by: PHYSICIAN ASSISTANT

## 2025-08-05 PROCEDURE — 99213 OFFICE O/P EST LOW 20 MIN: CPT | Performed by: PHYSICIAN ASSISTANT

## 2025-08-05 PROCEDURE — 84703 CHORIONIC GONADOTROPIN ASSAY: CPT | Performed by: PHYSICIAN ASSISTANT

## 2025-08-05 PROCEDURE — 3078F DIAST BP <80 MM HG: CPT | Performed by: PHYSICIAN ASSISTANT

## 2025-08-05 PROCEDURE — 3074F SYST BP LT 130 MM HG: CPT | Performed by: PHYSICIAN ASSISTANT

## 2025-08-05 PROCEDURE — 1125F AMNT PAIN NOTED PAIN PRSNT: CPT | Performed by: PHYSICIAN ASSISTANT

## 2025-08-05 PROCEDURE — 80053 COMPREHEN METABOLIC PANEL: CPT | Performed by: PHYSICIAN ASSISTANT

## 2025-08-05 PROCEDURE — 85027 COMPLETE CBC AUTOMATED: CPT | Performed by: PHYSICIAN ASSISTANT

## 2025-08-05 RX ORDER — PANTOPRAZOLE SODIUM 20 MG/1
20 TABLET, DELAYED RELEASE ORAL DAILY
Qty: 30 TABLET | Refills: 0 | Status: SHIPPED | OUTPATIENT
Start: 2025-08-05

## 2025-08-05 RX ORDER — FAMOTIDINE 20 MG/1
20 TABLET, FILM COATED ORAL 2 TIMES DAILY
Qty: 60 TABLET | Refills: 0 | Status: SHIPPED | OUTPATIENT
Start: 2025-08-05

## 2025-08-05 ASSESSMENT — PAIN SCALES - GENERAL: PAINLEVEL_OUTOF10: MODERATE PAIN (6)

## 2025-08-06 PROCEDURE — 87338 HPYLORI STOOL AG IA: CPT | Performed by: PHYSICIAN ASSISTANT

## 2025-08-07 LAB — H PYLORI AG STL QL IA: NEGATIVE

## 2025-08-14 ENCOUNTER — VIRTUAL VISIT (OUTPATIENT)
Dept: FAMILY MEDICINE | Facility: CLINIC | Age: 25
End: 2025-08-14
Payer: COMMERCIAL

## 2025-08-14 DIAGNOSIS — R42 DIZZINESS: ICD-10-CM

## 2025-08-14 DIAGNOSIS — H93.12 TINNITUS OF LEFT EAR: ICD-10-CM

## 2025-08-14 DIAGNOSIS — M54.2 NECK PAIN: Primary | ICD-10-CM

## 2025-08-14 DIAGNOSIS — G44.209 TENSION HEADACHE: ICD-10-CM

## 2025-08-14 PROCEDURE — 98006 SYNCH AUDIO-VIDEO EST MOD 30: CPT | Performed by: INTERNAL MEDICINE

## 2025-08-18 ENCOUNTER — HOSPITAL ENCOUNTER (OUTPATIENT)
Dept: CT IMAGING | Facility: CLINIC | Age: 25
Discharge: HOME | End: 2025-08-18
Attending: INTERNAL MEDICINE | Admitting: INTERNAL MEDICINE
Payer: COMMERCIAL

## 2025-08-18 DIAGNOSIS — M54.2 NECK PAIN: ICD-10-CM

## 2025-08-18 DIAGNOSIS — G44.209 TENSION HEADACHE: ICD-10-CM

## 2025-08-18 DIAGNOSIS — R42 DIZZINESS: ICD-10-CM

## 2025-08-18 DIAGNOSIS — H93.12 TINNITUS OF LEFT EAR: ICD-10-CM

## 2025-08-18 PROCEDURE — 250N000011 HC RX IP 250 OP 636: Performed by: INTERNAL MEDICINE

## 2025-08-18 PROCEDURE — 70496 CT ANGIOGRAPHY HEAD: CPT

## 2025-08-18 PROCEDURE — 250N000009 HC RX 250: Performed by: INTERNAL MEDICINE

## 2025-08-18 RX ORDER — IOPAMIDOL 755 MG/ML
67 INJECTION, SOLUTION INTRAVASCULAR ONCE
Status: COMPLETED | OUTPATIENT
Start: 2025-08-18 | End: 2025-08-18

## 2025-08-18 RX ADMIN — IOPAMIDOL 67 ML: 755 INJECTION, SOLUTION INTRAVENOUS at 12:10

## 2025-08-18 RX ADMIN — SODIUM CHLORIDE 100 ML: 9 INJECTION, SOLUTION INTRAVENOUS at 12:11

## 2025-08-20 ENCOUNTER — RESULTS FOLLOW-UP (OUTPATIENT)
Dept: FAMILY MEDICINE | Facility: CLINIC | Age: 25
End: 2025-08-20
Payer: COMMERCIAL

## 2025-08-20 DIAGNOSIS — M54.2 NECK PAIN: Primary | ICD-10-CM

## 2025-08-21 RX ORDER — CYCLOBENZAPRINE HCL 5 MG
5 TABLET ORAL 3 TIMES DAILY PRN
Qty: 30 TABLET | Refills: 0 | Status: SHIPPED | OUTPATIENT
Start: 2025-08-21

## 2025-08-22 ENCOUNTER — ANCILLARY PROCEDURE (OUTPATIENT)
Dept: ULTRASOUND IMAGING | Facility: CLINIC | Age: 25
End: 2025-08-22
Attending: INTERNAL MEDICINE
Payer: COMMERCIAL

## 2025-08-22 ENCOUNTER — MYC MEDICAL ADVICE (OUTPATIENT)
Dept: FAMILY MEDICINE | Facility: CLINIC | Age: 25
End: 2025-08-22

## 2025-08-22 DIAGNOSIS — M54.2 NECK PAIN: ICD-10-CM

## 2025-08-22 PROCEDURE — 76536 US EXAM OF HEAD AND NECK: CPT

## 2025-09-04 ENCOUNTER — ANCILLARY PROCEDURE (OUTPATIENT)
Dept: ULTRASOUND IMAGING | Facility: CLINIC | Age: 25
End: 2025-09-04
Attending: INTERNAL MEDICINE
Payer: COMMERCIAL

## 2025-09-04 DIAGNOSIS — E04.1 THYROID NODULE: ICD-10-CM

## 2025-09-04 PROCEDURE — 76536 US EXAM OF HEAD AND NECK: CPT

## (undated) LAB — PTH-INTACT SERPL-MCNC: 30 PG/ML (ref 15–65)